# Patient Record
Sex: FEMALE | Race: WHITE | NOT HISPANIC OR LATINO | Employment: FULL TIME | URBAN - METROPOLITAN AREA
[De-identification: names, ages, dates, MRNs, and addresses within clinical notes are randomized per-mention and may not be internally consistent; named-entity substitution may affect disease eponyms.]

---

## 2022-01-26 ENCOUNTER — LAB VISIT (OUTPATIENT)
Dept: LAB | Facility: HOSPITAL | Age: 27
End: 2022-01-26
Attending: FAMILY MEDICINE
Payer: COMMERCIAL

## 2022-01-26 ENCOUNTER — OFFICE VISIT (OUTPATIENT)
Dept: PRIMARY CARE CLINIC | Facility: CLINIC | Age: 27
End: 2022-01-26
Payer: COMMERCIAL

## 2022-01-26 VITALS
OXYGEN SATURATION: 98 % | TEMPERATURE: 98 F | HEART RATE: 91 BPM | BODY MASS INDEX: 25.71 KG/M2 | HEIGHT: 67 IN | SYSTOLIC BLOOD PRESSURE: 120 MMHG | WEIGHT: 163.81 LBS | DIASTOLIC BLOOD PRESSURE: 70 MMHG

## 2022-01-26 DIAGNOSIS — Z00.01 ENCOUNTER FOR PREVENTATIVE ADULT HEALTH CARE EXAM WITH ABNORMAL FINDINGS: ICD-10-CM

## 2022-01-26 DIAGNOSIS — Z00.01 ENCOUNTER FOR PREVENTATIVE ADULT HEALTH CARE EXAM WITH ABNORMAL FINDINGS: Primary | ICD-10-CM

## 2022-01-26 DIAGNOSIS — M77.8 RIGHT ELBOW TENDONITIS: ICD-10-CM

## 2022-01-26 LAB
CHOLEST SERPL-MCNC: 144 MG/DL (ref 120–199)
CHOLEST/HDLC SERPL: 2.4 {RATIO} (ref 2–5)
ESTIMATED AVG GLUCOSE: 97 MG/DL (ref 68–131)
HBA1C MFR BLD: 5 % (ref 4–5.6)
HDLC SERPL-MCNC: 59 MG/DL (ref 40–75)
HDLC SERPL: 41 % (ref 20–50)
LDLC SERPL CALC-MCNC: 67.6 MG/DL (ref 63–159)
NONHDLC SERPL-MCNC: 85 MG/DL
TRIGL SERPL-MCNC: 87 MG/DL (ref 30–150)

## 2022-01-26 PROCEDURE — 36415 COLL VENOUS BLD VENIPUNCTURE: CPT | Mod: PN | Performed by: FAMILY MEDICINE

## 2022-01-26 PROCEDURE — 80061 LIPID PANEL: CPT | Performed by: FAMILY MEDICINE

## 2022-01-26 PROCEDURE — 3078F PR MOST RECENT DIASTOLIC BLOOD PRESSURE < 80 MM HG: ICD-10-PCS | Mod: CPTII,S$GLB,, | Performed by: FAMILY MEDICINE

## 2022-01-26 PROCEDURE — 87389 HIV-1 AG W/HIV-1&-2 AB AG IA: CPT | Performed by: FAMILY MEDICINE

## 2022-01-26 PROCEDURE — 1159F PR MEDICATION LIST DOCUMENTED IN MEDICAL RECORD: ICD-10-PCS | Mod: CPTII,S$GLB,, | Performed by: FAMILY MEDICINE

## 2022-01-26 PROCEDURE — 1160F PR REVIEW ALL MEDS BY PRESCRIBER/CLIN PHARMACIST DOCUMENTED: ICD-10-PCS | Mod: CPTII,S$GLB,, | Performed by: FAMILY MEDICINE

## 2022-01-26 PROCEDURE — 83036 HEMOGLOBIN GLYCOSYLATED A1C: CPT | Performed by: FAMILY MEDICINE

## 2022-01-26 PROCEDURE — 3074F PR MOST RECENT SYSTOLIC BLOOD PRESSURE < 130 MM HG: ICD-10-PCS | Mod: CPTII,S$GLB,, | Performed by: FAMILY MEDICINE

## 2022-01-26 PROCEDURE — 99999 PR PBB SHADOW E&M-EST. PATIENT-LVL IV: CPT | Mod: PBBFAC,,, | Performed by: FAMILY MEDICINE

## 2022-01-26 PROCEDURE — 3008F PR BODY MASS INDEX (BMI) DOCUMENTED: ICD-10-PCS | Mod: CPTII,S$GLB,, | Performed by: FAMILY MEDICINE

## 2022-01-26 PROCEDURE — 99999 PR PBB SHADOW E&M-EST. PATIENT-LVL IV: ICD-10-PCS | Mod: PBBFAC,,, | Performed by: FAMILY MEDICINE

## 2022-01-26 PROCEDURE — 99385 PR PREVENTIVE VISIT,NEW,18-39: ICD-10-PCS | Mod: S$GLB,,, | Performed by: FAMILY MEDICINE

## 2022-01-26 PROCEDURE — 1160F RVW MEDS BY RX/DR IN RCRD: CPT | Mod: CPTII,S$GLB,, | Performed by: FAMILY MEDICINE

## 2022-01-26 PROCEDURE — 3078F DIAST BP <80 MM HG: CPT | Mod: CPTII,S$GLB,, | Performed by: FAMILY MEDICINE

## 2022-01-26 PROCEDURE — 3008F BODY MASS INDEX DOCD: CPT | Mod: CPTII,S$GLB,, | Performed by: FAMILY MEDICINE

## 2022-01-26 PROCEDURE — 3074F SYST BP LT 130 MM HG: CPT | Mod: CPTII,S$GLB,, | Performed by: FAMILY MEDICINE

## 2022-01-26 PROCEDURE — 99385 PREV VISIT NEW AGE 18-39: CPT | Mod: S$GLB,,, | Performed by: FAMILY MEDICINE

## 2022-01-26 PROCEDURE — 1159F MED LIST DOCD IN RCRD: CPT | Mod: CPTII,S$GLB,, | Performed by: FAMILY MEDICINE

## 2022-01-26 PROCEDURE — 86803 HEPATITIS C AB TEST: CPT | Performed by: FAMILY MEDICINE

## 2022-01-26 NOTE — PATIENT INSTRUCTIONS
Patient Education       Tendinopathy   About this topic   A tendon is a thick cord that attaches muscle to bone. Tendinopathy is an injury to the tendon. There may be irritation or small tears to the tendon. There are different types of tendinopathy.  · Tendinosis ? Is a problem when the tendons break down and likely tear over time. The structure of the tendon, in fact, changes. There is no swelling or inflammation present.  · Tenosynovitis ? The thin cover around a tendon, called the tendon sheath, becomes swollen.  · Calcific tendinopathy ? Calcium deposits form in the tendon. This is most common in the shoulder or rotator cuff tendons.  This condition may happen in any tendon. It often happens in the hand, shoulder, elbow, or wrist. Some people have problems in their knee or the back of the heel.  What are the causes?   · Injury  · Using the tendon over and over  · Infection  · Some drugs  What can make this more likely to happen?   Things that lead to tendinopathy are:  · Age ? As people get older, their tendons become less flexible.  · Work ? More common with work that uses repeated motions, awkward positions, frequent reaching, and forceful effort.  · Sports ? Doing the same moves done over and over like those used in baseball, basketball, bowling, golf, and tennis.  · Other things ? Being heavy, certain illnesses like diabetes or rheumatoid arthritis.  What are the main signs?   You may feel:  · Pain and soreness along a tendon, most often near a joint. It is worse with added weight, movement, and often happens at night.  · Pain is severe or sharp in early tendinopathy and then is more of a dull ache in later stages of tendinopathy.  · A grinding feeling when you move the tendon  · Lack of strength due to pain  · Stiffness or problems moving the tendon. This is often worse in the morning.  · A lump on the tendon  · Range of motion may be limited  · Muscle wasting or weakness  The skin over the tendon may be  swollen, red, and warm as well.  How does the doctor diagnose this health problem?   Your doctor will do an exam and take your history. The doctor may push, pull, feel, and move the sore part. You may need to have tests like:  · X-ray to show if there are calcium deposits  · CT or MRI  · Ultrasound  · Lab tests  How does the doctor treat this health problem?   · Rest the painful part  · Place an ice pack or a bag of frozen peas wrapped in a towel over the painful part. Never put ice right on the skin. Do not leave the ice on more than 10 to 15 minutes at a time.  · Compress or wrap the swollen part to help ease pain. The doctor may give you a brace or strap to wear.  · Prop the sore part on pillows.  · Skip any action that makes the pain worse.  · Your doctor may suggest an exercise program to build muscle strength. You may need to see a physical therapist.  · Your doctor may use a sling or splint to keep the affected part from moving.  · You may be told to do a special kind of massage on the sore part. This is a cross friction massage.  · You may be told to do ice therapy over the tendon. This is done by freezing water in a styrofoam or paper cup. Then you rub the area over the sore tendon with the ice for 5 to 10 minutes up to 3 to 5 times a day.  What drugs may be needed?   The doctor may order drugs to:  · Help with pain and swelling  · Prevent an infection if you had surgery  The doctor may give you a shot of an anti-inflammatory drug called a corticosteroid. This will help with swelling. Talk with your doctor about the risks of this shot.  What problems could happen?   · Tendon ruptures or fully breaks apart  · Ongoing pain  · Problem comes back  · Problems doing activities  · More weakness or stiffness  What can be done to prevent this health problem?   · Take breaks often when doing things that use the same movement over and over.  · Stay active and work out to keep your muscles strong and flexible.  · Warm  up slowly and stretch before you work out. Use good ways to train, such as slowly adding to how far you run. Do not work out if you are overly tired. Take extra care if working out in cold weather.  · Keep a healthy weight. Being heavy puts more stress on your joints. This makes them more likely to be hurt.  · Wear braces or straps during activities if you get the same problem over and over.  Where can I learn more?   [employer]  https://www.betterhealth.aneesh.gov.au/health/ConditionsAndTreatments/tendonitis   [facility]  http://www.nhs.uk/Conditions/Tendonitis/Pages/Symptoms.aspx   Last Reviewed Date   2020-10-21  Consumer Information Use and Disclaimer   This information is not specific medical advice and does not replace information you receive from your health care provider. This is only a brief summary of general information. It does NOT include all information about conditions, illnesses, injuries, tests, procedures, treatments, therapies, discharge instructions or life-style choices that may apply to you. You must talk with your health care provider for complete information about your health and treatment options. This information should not be used to decide whether or not to accept your health care providers advice, instructions or recommendations. Only your health care provider has the knowledge and training to provide advice that is right for you.  Copyright   Copyright © 2021 [employer] and its affiliates and/or licensors. All rights reserved.  Patient Education       Lateral Epicondylitis Exercises   About this topic   The elbow is where your upper arm bone meets the two lower bones in your arm. There is a bump on the outside of your elbow at the bottom of your upper arm bone. It is the lateral epicondyle. A few tendons attach here. Tendons attach muscles to bone. These muscles are used to pull your wrist and fingers up.   When these tendons get sore and swollen from overuse,  you have lateral epicondylitis. Is also called tennis elbow. This is a common problem in tennis players. It may also happen with other activities or sports. You are more likely to have this problem in the arm you use most, but it can happen in either arm. Exercises can help to make this problem better.  General   Before starting with a program, ask your doctor if you are healthy enough to do these exercises. Your doctor may have you work with a  or physical therapist to make a safe exercise program to meet your needs.  Stretching Exercises   Stretching exercises keep your muscles flexible. They also stop them from getting tight. Start by doing each of these stretches 2 to 3 times. In order for your body to make changes, you will need to hold these stretches for 20 to 30 seconds. Try to do the stretches 2 to 3 times each day. Do all exercises slowly.  · Wrist stretches bending down ? Straighten your elbow and have your palm facing down. Keeping your sore elbow straight, bend your hand and fingers down so that your fingers are now pointing to the floor. Grab your hand with your other hand and push back the wrist further until you feel a stretch.  Strengthening Exercises   Strengthening exercises keep your muscles firm and strong. Start by repeating each exercise 2 to 3 times. Work up to doing each exercise 10 times. Try to do the exercises 2 to 3 times each day. Do all exercises slowly.  Some exercises can be done holding a small weight. You can use a can of soup or a hand weight. If the exercise gets too easy, use heavier weights or do the exercise more times.  · Wrist exercises seated with your lower arm supported on a table or your leg. Your hand should be off the edge:  ? Bending up ? Have your palm facing UP with a small weight in your hand. Bend your wrist up as far as you can. Now, lower the weight back down. Be sure to control the weight as you lower it. Repeat using other hand.  ? Bending down ? Have  your palm facing DOWN with a small weight in your hand. Bend your wrist back as far as you can. Now, lower the weight back down. Be sure to control the weight as you lower it. Repeat using other hand.  ? Side-to-side ? Position your hand SIDEWAYS with your thumb up. With a weight in your hand, lift your hand straight up. Now, lower your hand back down. Repeat using other hand.  · Lower arm twists with weight ? Start by having your elbow bent with your arm at your side. Hold a small weight in your hand. Keeping your arm at your side and not moving your elbow, turn the lower arm until your palm is facing down. Now, turn the lower arm until your palm is facing up. Repeat using other hand.  · Finger spreads with rubber band ? Find a thick rubber band. Straighten your fingers and bring them together so they are touching each other. Place the rubber band around your fingers and thumb as close to the nails as possible. Spread your fingers out as far as you can. Then, slowly bring them back to the starting position.  · Ball squeezes ? Gently squeeze a tennis ball 10 times. If you have no pain, squeeze with more pressure.  · Tennis strokes with exercise band ? Once your pain has lessened and you are almost ready to return to the tennis court, try these 3 exercises. You will need to exercise near a door or closet that can be opened and closed easily. Start with a thinner band and work your way up to a thicker band. These band exercises can help you with three tennis strokes:  ? Damon ? Secure an exercise band in a door at waist level. Stand sideways with the hand you use the closest to the door. Grab the band with that hand. Pull the band across your body like you do in a damon motion.  ? Backhand ? Secure an exercise band in a door at waist level. Stand sideways with the hand you use the most away from the door. Reach toward the door and grab the band with this hand. Now, pull the band across your body like you do in a  backhand motion.  ? Serve ? Secure an exercise band in a door at shoulder level. Stand facing away from the door. Grab the band with the hand you use the most. Start with your hand up and behind your head like you would for the start of a serve. Pull the band up and over like you are doing a serving motion.  Your doctor or therapist may also have you use a rubber bar or Flex bar for exercises to help your lateral epicondylitis.               What will the results be?   · Less pain and swelling  · Increased strength  · Better range of motion  · Greater ease doing arm activities  Helpful tips   · Stay active and work out to keep your muscles strong and flexible.  · Keep a healthy weight to avoid putting too much stress on your joints. Eat a healthy diet to keep your muscles healthy.  · Be sure you do not hold your breath when exercising. This can raise your blood pressure. If you tend to hold your breath, try counting out loud when exercising. If any exercise bothers you, stop right away.  · Always warm up before stretching. Heated muscles stretch much easier than cool muscles. Stretching cool muscles can lead to injury.  · Try walking and swinging your arms at an easy pace for a few minutes to warm up your muscles. Do this again after exercising.  · Never bounce when doing stretches.  · Doing exercises before a meal may be a good way to get into a routine.  · If you are using weights, choose a weight that will allow you to repeat the exercise 10 times before resting. If you easily do 10 repeats, you may not be using enough weight. If you are not able to do 10 repeats, you are using too heavy of a weight.  · Exercise may be slightly uncomfortable, but you should not have sharp pains. If you do get sharp pains, stop what you are doing. If the sharp pains continue, call your doctor.  Where can I learn more?   American Academy [employer]  [employer]  https://orthoinfo.aaos.org/en/recovery/epicondylitis-therapeutic-exercise-program/   Last Reviewed Date   2021-08-03  Consumer Information Use and Disclaimer   This information is not specific medical advice and does not replace information you receive from your health care provider. This is only a brief summary of general information. It does NOT include all information about conditions, illnesses, injuries, tests, procedures, treatments, therapies, discharge instructions or life-style choices that may apply to you. You must talk with your health care provider for complete information about your health and treatment options. This information should not be used to decide whether or not to accept your health care providers advice, instructions or recommendations. Only your health care provider has the knowledge and training to provide advice that is right for you.  Copyright   Copyright © 2021 [employer] and its affiliates and/or licensors. All rights reserved.  Patient Education       Overuse Injuries   About this topic   Overuse injuries can happen to your bones, nerves, muscles, joints, tendons, or ligaments. If you overdo an exercise or activity you may notice swelling, stiffness, or soreness. These are the first signs of overuse.  What are the causes?   Overuse injuries may be caused by:  · Doing the same activity over and over  · Not giving your body a chance to rest or recover after an activity  · Doing too much exercise.  · Poor training  What can make this more likely to happen?   · Not being flexible around your joint  · Some kinds of body alignment, like flat feet or bowlegs  · Tight or weak muscles  · Old injuries  · Kind of equipment you use to train  What are the main signs?   The first sign of an overuse injury may be pain. This may go away when you warm up. If your problem continues, your pain may go away at first, but then come back after you are finished with your activity. Later, you  may start to have more pain with your activity. Finally, you may have pain all the time or problems using your body part. Other signs might include swelling, redness, stiffness, muscle cramping, or spasm.  How does the doctor diagnose this health problem?   The doctor will ask you questions about your health history and do an exam. The doctor may have you move your sore body part. The doctor may order:  · X-rays  · MRI or CT scan  · Lab tests  How does the doctor treat this health problem?   Your doctor will first want you to stop doing what is causing the pain or stress to your body. You may not be able to stop it completely but try to limit the amount of time you do it. You can also change how you do the activity that is bothering you. Take short breaks more often. Your doctor may suggest:  · Rest  · Ice  · Splints or elastic bandages for support  · Physical therapy  · Chiropractic care  · Massage  · Surgery  What drugs may be needed?   The doctor may order drugs to:  · Help with pain and swelling  · Help relax muscle spasm  The doctor may give you a shot of an anti-inflammatory drug called a corticosteroid. This will help with swelling.  Helpful tips   · Take frequent rest breaks when doing the same motion or activity. Vary tasks and exercises if possible.  · Stay active and work out to keep your muscles strong and flexible.  · Warm up slowly and stretch after you work out. Use good ways to train, such as slowly adding to how far you run. Do not work out if you are overly tired. Take extra care if working out in very cold or very hot weather.  · Vary the activities you do and how you train.  · Wear the right equipment when playing sports.  · Always maintain good posture.  · Keep a healthy weight. Being heavy puts more stress on your joints and then you are more likely to get hurt.  · Wear supportive shoes. If your feet are flat, talk with your doctor about getting inserts or orthotics for your shoes.  · Request a  "workplace assessment so that adjustments can be made to make your tasks more comfortable.  Where can I learn more?   American Academy of [employer]icians  https://familydoctor.org/common-sports-injuries/   [facility] Choices  https://www.nhs.uk/conditions/repetitive-strain-injury-rsi/   Last Reviewed Date   2020-10-09  Consumer Information Use and Disclaimer   This information is not specific medical advice and does not replace information you receive from your health care provider. This is only a brief summary of general information. It does NOT include all information about conditions, illnesses, injuries, tests, procedures, treatments, therapies, discharge instructions or life-style choices that may apply to you. You must talk with your health care provider for complete information about your health and treatment options. This information should not be used to decide whether or not to accept your health care providers advice, instructions or recommendations. Only your health care provider has the knowledge and training to provide advice that is right for you.  Copyright   Copyright © 2021 Up[employer], Inc. and its affiliates and/or licensors. All rights reserved.  Patient Education       Elbow Tendinopathy (Tennis and Golf Elbow)   The Basics   Written by the doctors and editors at [employer]   What is elbow tendinopathy? -- Elbow tendinopathy is a condition that causes elbow pain and forearm weakness. The word "tendinopathy" refers to a problem with a tendon. Tendons are strong bands of tissue that connect muscles to bones. Depending on which elbow tendon is injured, the condition is also known as "tennis elbow" or "golf elbow." Doctors also used to call this condition "tendinitis."   What causes elbow tendinopathy? -- This condition can happen as people get older, especially if they do a lot of work or activity using their elbow and forearm. It can also happen when people get hurt or do the same movements over and " "over.  The terms "tennis elbow" and "golf elbow" refer to the swinging motion people do in these sports, which can cause tendinopathy. But other activities or jobs can also cause this problem if they involve similar movements.  What are the symptoms of elbow tendinopathy? -- The most common symptoms are:  · Elbow pain - This is the main symptom of elbow tendinopathy. Pain can start slowly or suddenly, and can be mild or more severe. It can spread to the upper arm or forearm. Pain is most common when the tendon is working or stretched.   · Muscle weakness - The forearm muscles might feel weak when you  or squeeze something.  · Swelling - Some people might have mild swelling in the elbow area.  Will I need tests? -- You might. Your doctor or nurse should be able to tell if you have elbow tendinopathy by talking with you and doing an exam. They might also have you do specific arm movements to better understand what motions or activities cause pain.   In some cases, the doctor might also do an imaging test, such as an ultrasound. Imaging tests create pictures of the inside of the body.  How is elbow tendinopathy treated? -- Most of the time, this condition will get better on its own, but it can take months to heal completely. To help get better, you can:  · Rest your elbow and arm - If possible, try to avoid or reduce activities that make your pain worse.  · Wear a brace or sleeve - Ask your doctor or nurse about this. Wearing a special brace or "compression sleeve" can help support your elbow and relieve pain. These work by reducing strain on the tendon when you use your arm.  · Take a pain-relieving medicine - Your doctor might recommend that you take a medicine such as acetaminophen (sample brand name: Tylenol), ibuprofen (sample brand names: Advil, Motrin), or naproxen (sample brand names: Aleve, Naprosyn).  · Put ice on your elbow - This might help after doing activities that make your pain worse. Put a cold " gel pack, bag of ice, or bag of frozen vegetables on the area every 1 to 2 hours, for 15 minutes each time. Put a thin towel between the ice (or other cold object) and your skin.   · Do stretches - Stretching the muscles in your lower arm can be helpful. The following are stretches you can do at home, depending on which tendon is injured:  ? Tennis elbow stretch - Hold your injured arm straight out, and point your fingers down to the ground. Use your other hand (with the thumb pressing on the palm) to grab this hand. Then press down on the back of the hand to bend the wrist more (picture 1). Hold this position for 30 seconds. Repeat the stretch 3 times. Do this exercise 1 time a day.  ? Golf elbow stretch - Stand an arm's length away from the wall, with the injured arm closest to the wall. Put your palm on the wall, with your fingers pointing down. Press gently against the wall to stretch your muscles (picture 2). Hold this position for 30 seconds. Repeat the stretch 3 times. Do this exercise 1 time a day.  · Get physical therapy - This involves learning specific exercises to help with your symptoms. The right exercises for you will depend on which elbow tendon is injured. Other types of exercises can help make the forearm muscles stronger. Your doctor, nurse, or physical therapist (exercise expert) can show you how to do these types of exercises. They will tell you when to start them and how often to do them.  What if my symptoms don't get better? -- If you have been doing your exercises for at least 8 to 12 weeks and your symptoms are not getting better, talk with your doctor or nurse. They can suggest other treatments that might help. They might also want to do imaging tests, like an ultrasound or X-ray, to see if something else might be causing your symptoms.  Rarely, elbow tendinopathy is treated with surgery. This might be considered if other treatments do not help after many months. But most of the time, the  condition will get better without surgery.  Can elbow tendinopathy be prevented? -- Yes. To help prevent elbow tendinopathy, you can:  · Take breaks when you do activities in which you move your elbow and wrist a lot.  · Keep your elbows slightly bent when you exercise or lift things.  · Wear gloves or use 2 hands when using tools.  · If you play tennis, use a 2-handed backhand swing.  · If you play golf, use  tape or padding on your golf clubs.  All topics are updated as new evidence becomes available and our peer review process is complete.  This topic retrieved from [employer] on: Sep 21, 2021.  Topic 79500 Version 7.0  Release: 29.4.2 - C29.263  © 2021 UpToDate, Inc. and/or its affiliates. All rights reserved.  picture 1: Forearm extensor stretch     Hold your injured arm straight out in front of you, palm down, with your fingers pointing toward the ground. Use your other hand to grab the hand on the injured side, pressing your thumb into the palm. Then press down on the back of the hand on the injured side so that your wrist bends further than it does on its own. Hold this position for 30 seconds.  Graphic 74399 Version 6.0    picture 2: Golf elbow stretch     Stand an arm's length away from the wall, with the injured arm closest to the wall. Put your palm on the wall, with your fingers pointing down. Press gently against the wall to stretch your muscles. Hold this position for 30 seconds. Repeat the stretch 3 times. Do this exercise 1 time a day.  Graphic 63628 Version 1.0    Consumer Information Use and Disclaimer   This information is not specific medical advice and does not replace information you receive from your health care provider. This is only a brief summary of general information. It does NOT include all information about conditions, illnesses, injuries, tests, procedures, treatments, therapies, discharge instructions or life-style choices that may apply to you. You must talk with your health care  provider for complete information about your health and treatment options. This information should not be used to decide whether or not to accept your health care provider's advice, instructions or recommendations. Only your health care provider has the knowledge and training to provide advice that is right for you. The use of this information is governed by the [employer] End User License Agreement, available at https://www.[employer]/en/solutions/[employer]/about/rere.The use of [employer] content is governed by the [employer] Terms of Use. ©2021 [employer] All rights reserved.  Copyright   © 2021 [employer] and/or its affiliates. All [id number]

## 2022-01-26 NOTE — PROGRESS NOTES
Subjective:      Patient ID: Karen Castillo is a 26 y.o. female.    Chief Complaint: Establish Care and Other    Disclaimer:  This note is prepared using voice recognition software and as such is likely to have errors and has not been proof read. Please contact me for questions.     Karen Castillo is a 26 y.o. female who presents today to establish care.   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 Will use braces and ergonomic settup.   Was using the [place] [employer]. But she left. It did help.   With soreness and will get painful with movement. Will use ibuprofen, ice, no numbness. Gets    Left ear pain - felt like it was blocked up and did h202 and some stuff came out. Better than it was.     History reviewed. No pertinent past medical history.  Past Surgical History:  10/07/2021: SALPINGECTOMY; Bilateral  10/07/2021: TUBAL LIGATION      Comment:  Norma  family history includes Asthma in her sister; Diabetes in her father; Hypothyroidism in her maternal grandmother; Lung cancer in her maternal grandfather; Migraines in her mother.  Social History    Socioeconomic History      Marital status: Single    Occupational History        Comment:      Tobacco Use      Smoking status: Never Smoker      Smokeless tobacco: Never Used    Substance and Sexual Activity      Alcohol use: Yes        Alcohol/week: 2.0 standard drinks        Types: 2 Glasses of wine per week      Drug use: Not Currently        Types: Marijuana        Comment: Used to manage anxiety in college      Sexual activity: Yes        Partners: Male        Birth control/protection: See Surgical Hx        Comment: Norma Felipe is GYN. Not on micronor at this time. Has BTL.   Went to [employer]. Studied BIS.  now.           No results found for: WBC, HGB, HCT, PLT, CHOL, TRIG, HDL, LDLDIRECT, ALT, AST, NA,  "K, CL, CREATININE, BUN, CO2, TSH, PSA, INR, GLUF, HGBA1C, MICROALBUR    No image results found.        Review of Systems   Constitutional: Negative for activity change, appetite change, chills, fatigue and fever.   HENT: Negative for ear pain and trouble swallowing.    Eyes: Negative for pain and visual disturbance.   Respiratory: Negative for cough and shortness of breath.    Cardiovascular: Negative for chest pain and leg swelling.   Gastrointestinal: Negative for abdominal pain, blood in stool, nausea and vomiting.   Endocrine: Negative for cold intolerance and heat intolerance.   Genitourinary: Negative for dysuria and frequency.   Musculoskeletal: Positive for myalgias. Negative for arthralgias, joint swelling and neck pain.   Skin: Negative for color change and rash.   Neurological: Negative for dizziness, weakness, numbness and headaches.   Psychiatric/Behavioral: Negative for behavioral problems and sleep disturbance.     Objective:     Vitals:    01/26/22 0816   BP: 120/70   Pulse: 91   Temp: 97.6 °F (36.4 °C)   SpO2: 98%   Weight: 74.3 kg (163 lb 12.8 oz)   Height: 5' 7" (1.702 m)     Physical Exam  Vitals reviewed.   Constitutional:       Appearance: Normal appearance. She is well-developed and normal weight.   HENT:      Head: Normocephalic and atraumatic.      Right Ear: Tympanic membrane and external ear normal.      Left Ear: Tympanic membrane and external ear normal.      Nose: Nose normal.      Mouth/Throat:      Mouth: Mucous membranes are moist.      Pharynx: Oropharynx is clear.   Eyes:      Conjunctiva/sclera: Conjunctivae normal.      Pupils: Pupils are equal, round, and reactive to light.   Neck:      Thyroid: No thyromegaly.   Cardiovascular:      Rate and Rhythm: Normal rate and regular rhythm.      Heart sounds: No murmur heard.  No friction rub. No gallop.    Pulmonary:      Effort: Pulmonary effort is normal. No respiratory distress.      Breath sounds: No wheezing or rales.   Abdominal: "      General: Bowel sounds are normal. There is no distension.      Palpations: Abdomen is soft.      Tenderness: There is no abdominal tenderness. There is no rebound.   Musculoskeletal:      Right elbow: No swelling. Decreased range of motion. Tenderness present in lateral epicondyle.      Left elbow: Normal range of motion. No tenderness.      Right forearm: Tenderness and bony tenderness present. No swelling or edema.      Left forearm: No swelling, edema, tenderness or bony tenderness.      Right wrist: Normal.      Left wrist: Normal.      Cervical back: Normal range of motion and neck supple.   Lymphadenopathy:      Cervical: No cervical adenopathy.   Skin:     General: Skin is warm and dry.      Findings: No rash.   Neurological:      Mental Status: She is alert and oriented to person, place, and time.   Psychiatric:         Attention and Perception: Attention and perception normal.         Mood and Affect: Mood and affect normal.         Speech: Speech normal.         Behavior: Behavior normal.         Thought Content: Thought content normal.         Cognition and Memory: Cognition and memory normal.         Judgment: Judgment normal.       Assessment:     1. Encounter for preventative adult health care exam with abnormal findings    2. Right elbow tendonitis      Plan:   Karen was seen today for [facility] care and other.    Diagnoses and all orders for this visit:    Encounter for preventative adult health care exam with abnormal findings  Comments:  new patient. discussed HM issues. sees GYN. interested in flu shot but on a friday. labs today.   Orders:  -     Lipid Panel; Future  -     Hemoglobin A1C; Future  -     HIV 1/2 Ag/Ab (4th Gen); Future  -     Hepatitis C Antibody; Future    Right elbow tendonitis  Comments:  new problem. discussed home exercises, PT referral placed, bracing, topical treatments of voltaren gel and or biofreeze. if not improving, will refer to ortho.   Orders:  -     Ambulatory  referral/consult to Physical/Occupational Therapy; Future            Follow up in about 1 year (around 1/26/2023) for physical with Dr HENDRICKSON.    Patient Instructions   Patient Education       Tendinopathy   About this topic   A tendon is a thick cord that attaches muscle to bone. Tendinopathy is an injury to the tendon. There may be irritation or small tears to the tendon. There are different types of tendinopathy.  · Tendinosis ? Is a problem when the tendons break down and likely tear over time. The structure of the tendon, in fact, changes. There is no swelling or inflammation present.  · Tenosynovitis ? The thin cover around a tendon, called the tendon sheath, becomes swollen.  · Calcific tendinopathy ? Calcium deposits form in the tendon. This is most common in the shoulder or rotator cuff tendons.  This condition may happen in any tendon. It often happens in the hand, shoulder, elbow, or wrist. Some people have problems in their knee or the back of the heel.  What are the causes?   · Injury  · Using the tendon over and over  · Infection  · Some drugs  What can make this more likely to happen?   Things that lead to tendinopathy are:  · Age ? As people get older, their tendons become less flexible.  · Work ? More common with work that uses repeated motions, awkward positions, frequent reaching, and forceful effort.  · Sports ? Doing the same moves done over and over like those used in baseball, basketball, bowling, golf, and tennis.  · Other things ? Being heavy, certain illnesses like diabetes or rheumatoid arthritis.  What are the main signs?   You may feel:  · Pain and soreness along a tendon, most often near a joint. It is worse with added weight, movement, and often happens at night.  · Pain is severe or sharp in early tendinopathy and then is more of a dull ache in later stages of tendinopathy.  · A grinding feeling when you move the tendon  · Lack of strength due to pain  · Stiffness or problems moving the  tendon. This is often worse in the morning.  · A lump on the tendon  · Range of motion may be limited  · Muscle wasting or weakness  The skin over the tendon may be swollen, red, and warm as well.  How does the doctor diagnose this health problem?   Your doctor will do an exam and take your history. The doctor may push, pull, feel, and move the sore part. You may need to have tests like:  · X-ray to show if there are calcium deposits  · CT or MRI  · Ultrasound  · Lab tests  How does the doctor treat this health problem?   · Rest the painful part  · Place an ice pack or a bag of frozen peas wrapped in a towel over the painful part. Never put ice right on the skin. Do not leave the ice on more than 10 to 15 minutes at a time.  · Compress or wrap the swollen part to help ease pain. The doctor may give you a brace or strap to wear.  · Prop the sore part on pillows.  · Skip any action that makes the pain worse.  · Your doctor may suggest an exercise program to build muscle strength. You may need to see a physical therapist.  · Your doctor may use a sling or splint to keep the affected part from moving.  · You may be told to do a special kind of massage on the sore part. This is a cross friction massage.  · You may be told to do ice therapy over the tendon. This is done by freezing water in a styrofoam or paper cup. Then you rub the area over the sore tendon with the ice for 5 to 10 minutes up to 3 to 5 times a day.  What drugs may be needed?   The doctor may order drugs to:  · Help with pain and swelling  · Prevent an infection if you had surgery  The doctor may give you a shot of an anti-inflammatory drug called a corticosteroid. This will help with swelling. Talk with your doctor about the risks of this shot.  What problems could happen?   · Tendon ruptures or fully breaks apart  · Ongoing pain  · Problem comes back  · Problems doing activities  · More weakness or stiffness  What can be done to prevent this health  problem?   · Take breaks often when doing things that use the same movement over and over.  · Stay active and work out to keep your muscles strong and flexible.  · Warm up slowly and stretch before you work out. Use good ways to train, such as slowly adding to how far you run. Do not work out if you are overly tired. Take extra care if working out in cold weather.  · Keep a healthy weight. Being heavy puts more stress on your joints. This makes them more likely to be hurt.  · Wear braces or straps during activities if you get the same problem over and over.  Where can I learn more?   [employer]  https://www.betterhealth.aneesh.gov.au/health/ConditionsAndTreatments/tendonitis   National Health Service  http://www.nhs.uk/Conditions/Tendonitis/Pages/Symptoms.aspx   Last Reviewed Date   2020-10-21  Consumer Information Use and Disclaimer   This information is not specific medical advice and does not replace information you receive from your health care provider. This is only a brief summary of general information. It does NOT include all information about conditions, illnesses, injuries, tests, procedures, treatments, therapies, discharge instructions or life-style choices that may apply to you. You must talk with your health care provider for complete information about your health and treatment options. This information should not be used to decide whether or not to accept your health care providers advice, instructions or recommendations. Only your health care provider has the knowledge and training to provide advice that is right for you.  Copyright   Copyright © 2021 UpToDate, Inc. and its affiliates and/or licensors. All rights reserved.  Patient Education       Lateral Epicondylitis Exercises   About this topic   The elbow is where your upper arm bone meets the two lower bones in your arm. There is a bump on the outside of your elbow at the bottom of your upper arm bone. It is the lateral epicondyle. A  few tendons attach here. Tendons attach muscles to bone. These muscles are used to pull your wrist and fingers up.   When these tendons get sore and swollen from overuse, you have lateral epicondylitis. Is also called tennis elbow. This is a common problem in tennis players. It may also happen with other activities or sports. You are more likely to have this problem in the arm you use most, but it can happen in either arm. Exercises can help to make this problem better.  General   Before starting with a program, ask your doctor if you are healthy enough to do these exercises. Your doctor may have you work with a  or physical therapist to make a safe exercise program to meet your needs.  Stretching Exercises   Stretching exercises keep your muscles flexible. They also stop them from getting tight. Start by doing each of these stretches 2 to 3 times. In order for your body to make changes, you will need to hold these stretches for 20 to 30 seconds. Try to do the stretches 2 to 3 times each day. Do all exercises slowly.  · Wrist stretches bending down ? Straighten your elbow and have your palm facing down. Keeping your sore elbow straight, bend your hand and fingers down so that your fingers are now pointing to the floor. Grab your hand with your other hand and push back the wrist further until you feel a stretch.  Strengthening Exercises   Strengthening exercises keep your muscles firm and strong. Start by repeating each exercise 2 to 3 times. Work up to doing each exercise 10 times. Try to do the exercises 2 to 3 times each day. Do all exercises slowly.  Some exercises can be done holding a small weight. You can use a can of soup or a hand weight. If the exercise gets too easy, use heavier weights or do the exercise more times.  · Wrist exercises seated with your lower arm supported on a table or your leg. Your hand should be off the edge:  ? Bending up ? Have your palm facing UP with a small weight in your  hand. Bend your wrist up as far as you can. Now, lower the weight back down. Be sure to control the weight as you lower it. Repeat using other hand.  ? Bending down ? Have your palm facing DOWN with a small weight in your hand. Bend your wrist back as far as you can. Now, lower the weight back down. Be sure to control the weight as you lower it. Repeat using other hand.  ? Side-to-side ? Position your hand SIDEWAYS with your thumb up. With a weight in your hand, lift your hand straight up. Now, lower your hand back down. Repeat using other hand.  · Lower arm twists with weight ? Start by having your elbow bent with your arm at your side. Hold a small weight in your hand. Keeping your arm at your side and not moving your elbow, turn the lower arm until your palm is facing down. Now, turn the lower arm until your palm is facing up. Repeat using other hand.  · Finger spreads with rubber band ? Find a thick rubber band. Straighten your fingers and bring them together so they are touching each other. Place the rubber band around your fingers and thumb as close to the nails as possible. Spread your fingers out as far as you can. Then, slowly bring them back to the starting position.  · Ball squeezes ? Gently squeeze a tennis ball 10 times. If you have no pain, squeeze with more pressure.  · Tennis strokes with exercise band ? Once your pain has lessened and you are almost ready to return to the tennis court, try these 3 exercises. You will need to exercise near a door or closet that can be opened and closed easily. Start with a thinner band and work your way up to a thicker band. These band exercises can help you with three tennis strokes:  ? Damon ? Secure an exercise band in a door at waist level. Stand sideways with the hand you use the closest to the door. Grab the band with that hand. Pull the band across your body like you do in a damon motion.  ? Backhand ? Secure an exercise band in a door at waist level.  Stand sideways with the hand you use the most away from the door. Reach toward the door and grab the band with this hand. Now, pull the band across your body like you do in a backhand motion.  ? Serve ? Secure an exercise band in a door at shoulder level. Stand facing away from the door. Grab the band with the hand you use the most. Start with your hand up and behind your head like you would for the start of a serve. Pull the band up and over like you are doing a serving motion.  Your doctor or therapist may also have you use a rubber bar or Flex bar for exercises to help your lateral epicondylitis.               What will the results be?   · Less pain and swelling  · Increased strength  · Better range of motion  · Greater ease doing arm activities  Helpful tips   · Stay active and work out to keep your muscles strong and flexible.  · Keep a healthy weight to avoid putting too much stress on your joints. Eat a healthy diet to keep your muscles healthy.  · Be sure you do not hold your breath when exercising. This can raise your blood pressure. If you tend to hold your breath, try counting out loud when exercising. If any exercise bothers you, stop right away.  · Always warm up before stretching. Heated muscles stretch much easier than cool muscles. Stretching cool muscles can lead to injury.  · Try walking and swinging your arms at an easy pace for a few minutes to warm up your muscles. Do this again after exercising.  · Never bounce when doing stretches.  · Doing exercises before a meal may be a good way to get into a routine.  · If you are using weights, choose a weight that will allow you to repeat the exercise 10 times before resting. If you easily do 10 repeats, you may not be using enough weight. If you are not able to do 10 repeats, you are using too heavy of a weight.  · Exercise may be slightly uncomfortable, but you should not have sharp pains. If you do get sharp pains, stop what you are doing. If the  sharp pains continue, call your doctor.  Where can I learn more?   American Academy [employer]  https://orthoinfo.aaos.org/en/recovery/epicondylitis-therapeutic-exercise-program/   Last Reviewed Date   2021-08-03  Consumer Information Use and Disclaimer   This information is not specific medical advice and does not replace information you receive from your health care provider. This is only a brief summary of general information. It does NOT include all information about conditions, illnesses, injuries, tests, procedures, treatments, therapies, discharge instructions or life-style choices that may apply to you. You must talk with your health care provider for complete information about your health and treatment options. This information should not be used to decide whether or not to accept your health care providers advice, instructions or recommendations. Only your health care provider has the knowledge and training to provide advice that is right for you.  Copyright   Copyright © 2021 UpToDate, Inc. and its affiliates and/or licensors. All rights reserved.  Patient Education       Overuse Injuries   About this topic   Overuse injuries can happen to your bones, nerves, muscles, joints, tendons, or ligaments. If you overdo an exercise or activity you may notice swelling, stiffness, or soreness. These are the first signs of overuse.  What are the causes?   Overuse injuries may be caused by:  · Doing the same activity over and over  · Not giving your body a chance to rest or recover after an activity  · Doing too much exercise.  · Poor training  What can make this more likely to happen?   · Not being flexible around your joint  · Some kinds of body alignment, like flat feet or bowlegs  · Tight or weak muscles  · Old injuries  · Kind of equipment you use to train  What are the main signs?   The first sign of an overuse injury may be pain. This may go away when you warm up. If your problem continues, your  pain may go away at first, but then come back after you are finished with your activity. Later, you may start to have more pain with your activity. Finally, you may have pain all the time or problems using your body part. Other signs might include swelling, redness, stiffness, muscle cramping, or spasm.  How does the doctor diagnose this health problem?   The doctor will ask you questions about your health history and do an exam. The doctor may have you move your sore body part. The doctor may order:  · X-rays  · MRI or CT scan  · Lab tests  How does the doctor treat this health problem?   Your doctor will first want you to stop doing what is causing the pain or stress to your body. You may not be able to stop it completely but try to limit the amount of time you do it. You can also change how you do the activity that is bothering you. Take short breaks more often. Your doctor may suggest:  · Rest  · Ice  · Splints or elastic bandages for support  · Physical therapy  · Chiropractic care  · Massage  · Surgery  What drugs may be needed?   The doctor may order drugs to:  · Help with pain and swelling  · Help relax muscle spasm  The doctor may give you a shot of an anti-inflammatory drug called a corticosteroid. This will help with swelling.  Helpful tips   · Take frequent rest breaks when doing the same motion or activity. Vary tasks and exercises if possible.  · Stay active and work out to keep your muscles strong and flexible.  · Warm up slowly and stretch after you work out. Use good ways to train, such as slowly adding to how far you run. Do not work out if you are overly tired. Take extra care if working out in very cold or very hot weather.  · Vary the activities you do and how you train.  · Wear the right equipment when playing sports.  · Always maintain good posture.  · Keep a healthy weight. Being heavy puts more stress on your joints and then you are more likely to get hurt.  · Wear supportive shoes. If your  "feet are flat, talk with your doctor about getting inserts or orthotics for your shoes.  · Request a workplace assessment so that adjustments can be made to make your tasks more comfortable.  Where can I learn more?   American Academy of [employer]icians  https://familydoctor.org/common-sports-injuries/   NHS Choices  https://www.nhs.uk/conditions/repetitive-strain-injury-rsi/   Last Reviewed Date   2020-10-09  Consumer Information Use and Disclaimer   This information is not specific medical advice and does not replace information you receive from your health care provider. This is only a brief summary of general information. It does NOT include all information about conditions, illnesses, injuries, tests, procedures, treatments, therapies, discharge instructions or life-style choices that may apply to you. You must talk with your health care provider for complete information about your health and treatment options. This information should not be used to decide whether or not to accept your health care providers advice, instructions or recommendations. Only your health care provider has the knowledge and training to provide advice that is right for you.  Copyright   Copyright © 2021 UpToDate, Inc. and its affiliates and/or licensors. All rights reserved.  Patient Education       Elbow Tendinopathy (Tennis and Golf Elbow)   The Basics   Written by the doctors and editors at [employer]   What is elbow tendinopathy? -- Elbow tendinopathy is a condition that causes elbow pain and forearm weakness. The word "tendinopathy" refers to a problem with a tendon. Tendons are strong bands of tissue that connect muscles to bones. Depending on which elbow tendon is injured, the condition is also known as "tennis elbow" or "golf elbow." Doctors also used to call this condition "tendinitis."   What causes elbow tendinopathy? -- This condition can happen as people get older, especially if they do a lot of work or activity using " "their elbow and forearm. It can also happen when people get hurt or do the same movements over and over.  The terms "tennis elbow" and "golf elbow" refer to the swinging motion people do in these sports, which can cause tendinopathy. But other activities or jobs can also cause this problem if they involve similar movements.  What are the symptoms of elbow tendinopathy? -- The most common symptoms are:  · Elbow pain - This is the main symptom of elbow tendinopathy. Pain can start slowly or suddenly, and can be mild or more severe. It can spread to the upper arm or forearm. Pain is most common when the tendon is working or stretched.   · Muscle weakness - The forearm muscles might feel weak when you  or squeeze something.  · Swelling - Some people might have mild swelling in the elbow area.  Will I need tests? -- You might. Your doctor or nurse should be able to tell if you have elbow tendinopathy by talking with you and doing an exam. They might also have you do specific arm movements to better understand what motions or activities cause pain.   In some cases, the doctor might also do an imaging test, such as an ultrasound. Imaging tests create pictures of the inside of the body.  How is elbow tendinopathy treated? -- Most of the time, this condition will get better on its own, but it can take months to heal completely. To help get better, you can:  · Rest your elbow and arm - If possible, try to avoid or reduce activities that make your pain worse.  · Wear a brace or sleeve - Ask your doctor or nurse about this. Wearing a special brace or "compression sleeve" can help support your elbow and relieve pain. These work by reducing strain on the tendon when you use your arm.  · Take a pain-relieving medicine - Your doctor might recommend that you take a medicine such as acetaminophen (sample brand name: Tylenol), ibuprofen (sample brand names: Advil, Motrin), or naproxen (sample brand names: Aleve, Naprosyn).  · Put " ice on your elbow - This might help after doing activities that make your pain worse. Put a cold gel pack, bag of ice, or bag of frozen vegetables on the area every 1 to 2 hours, for 15 minutes each time. Put a thin towel between the ice (or other cold object) and your skin.   · Do stretches - Stretching the muscles in your lower arm can be helpful. The following are stretches you can do at home, depending on which tendon is injured:  ? Tennis elbow stretch - Hold your injured arm straight out, and point your fingers down to the ground. Use your other hand (with the thumb pressing on the palm) to grab this hand. Then press down on the back of the hand to bend the wrist more (picture 1). Hold this position for 30 seconds. Repeat the stretch 3 times. Do this exercise 1 time a day.  ? Golf elbow stretch - Stand an arm's length away from the wall, with the injured arm closest to the wall. Put your palm on the wall, with your fingers pointing down. Press gently against the wall to stretch your muscles (picture 2). Hold this position for 30 seconds. Repeat the stretch 3 times. Do this exercise 1 time a day.  · Get physical therapy - This involves learning specific exercises to help with your symptoms. The right exercises for you will depend on which elbow tendon is injured. Other types of exercises can help make the forearm muscles stronger. Your doctor, nurse, or physical therapist (exercise expert) can show you how to do these types of exercises. They will tell you when to start them and how often to do them.  What if my symptoms don't get better? -- If you have been doing your exercises for at least 8 to 12 weeks and your symptoms are not getting better, talk with your doctor or nurse. They can suggest other treatments that might help. They might also want to do imaging tests, like an ultrasound or X-ray, to see if something else might be causing your symptoms.  Rarely, elbow tendinopathy is treated with surgery. This  might be considered if other treatments do not help after many months. But most of the time, the condition will get better without surgery.  Can elbow tendinopathy be prevented? -- Yes. To help prevent elbow tendinopathy, you can:  · Take breaks when you do activities in which you move your elbow and wrist a lot.  · Keep your elbows slightly bent when you exercise or lift things.  · Wear gloves or use 2 hands when using tools.  · If you play tennis, use a 2-handed backhand swing.  · If you play golf, use  tape or padding on your golf clubs.  All topics are updated as new evidence becomes available and our peer review process is complete.  This topic retrieved from [employer] on: Sep 21, 2021.  Topic 62586 Version 7.0  Release: 29.4.2 - C29.263  © 2021 UpToDate, Inc. and/or its affiliates. All rights reserved.  picture 1: Forearm extensor stretch     Hold your injured arm straight out in front of you, palm down, with your fingers pointing toward the ground. Use your other hand to grab the hand on the injured side, pressing your thumb into the palm. Then press down on the back of the hand on the injured side so that your wrist bends further than it does on its own. Hold this position for 30 seconds.  Graphic 60767 Version 6.0    picture 2: Golf elbow stretch     Stand an arm's length away from the wall, with the injured arm closest to the wall. Put your palm on the wall, with your fingers pointing down. Press gently against the wall to stretch your muscles. Hold this position for 30 seconds. Repeat the stretch 3 times. Do this exercise 1 time a day.  Graphic 08247 Version 1.0    Consumer Information Use and Disclaimer   This information is not specific medical advice and does not replace information you receive from your health care provider. This is only a brief summary of general information. It does NOT include all information about conditions, illnesses, injuries, tests, procedures, treatments, therapies,  discharge instructions or life-style choices that may apply to you. You must talk with your health care provider for complete information about your health and treatment options. This information should not be used to decide whether or not to accept your health care provider's advice, instructions or recommendations. Only your health care provider has the knowledge and training to provide advice that is right for you. The use of this information is governed by the [employer] End User License Agreement, available at https://www.[employer]/en/solutions/[employer]/about/rere.The use of [employer] content is governed by the [employer] Terms of Use. ©2021 [employer] All rights reserved.  Copyright   © 2021 UpToDate, Inc. and/or its affiliates. All rights reserved.

## 2022-01-28 LAB
HCV AB SERPL QL IA: NEGATIVE
HIV 1+2 AB+HIV1 P24 AG SERPL QL IA: NEGATIVE

## 2022-02-28 ENCOUNTER — CLINICAL SUPPORT (OUTPATIENT)
Dept: REHABILITATION | Facility: HOSPITAL | Age: 27
End: 2022-02-28
Payer: COMMERCIAL

## 2022-02-28 DIAGNOSIS — M77.8 RIGHT ELBOW TENDONITIS: ICD-10-CM

## 2022-02-28 DIAGNOSIS — R52 PAIN: ICD-10-CM

## 2022-02-28 PROCEDURE — 97110 THERAPEUTIC EXERCISES: CPT | Mod: PN

## 2022-02-28 PROCEDURE — 97165 OT EVAL LOW COMPLEX 30 MIN: CPT | Mod: PN

## 2022-02-28 NOTE — PROGRESS NOTES
"  [facility] Occupational Therapy  Initial Evaluation     Date: 2/28/2022  Name: Karen Castillo  Clinic Number: [id number]    Therapy Diagnosis: No diagnosis found.  Physician: Adali Paz MD    Physician Orders: Evaluate and treat   Medical Diagnosis: M77.8 (ICD-10-CM) - Right elbow tendonitis   Surgical Procedure and Date: N/A , N/A   Evaluation Date: 2/28/2022  Insurance Authorization Period Expiration: 01/26/2022 - 01/26/2023   Plan of Care Certification Period: 2/28/2022 - 5/23/2022  Date of Return to MD: N/A     Visit # / Visits authorized: 1 / 1  Time In:10:00  Time Out: 10:45  Total Appointment Time (timed & untimed codes): 45 minutes    Precautions:  Standard    Subjective     Involved Side: right   Dominant Side: Right  Date of Onset: at least 4 years duration, continually gotten worse over last 2 years since the start of the pandemic   History of Current Condition/Mechanism of Injury: gradual onset of elbow pain over past 4 years. Has worked previously at [employer] and experienced elbow pain then.  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 She is currently unable to rest overuse injury, working approximately 60 hours per week and is attending graduate school as well.   Imaging: none   Previous Therapy: none     Past Medical History/Physical Systems Review:   Karen Castillo  has no past medical history on file.    Karen Castillo  has a past surgical history that includes Salpingectomy (Bilateral, 10/07/2021) and Tubal ligation (10/07/2021).    Karen currently has no medications in their medication list.    Review of patient's allergies indicates:   Allergen Reactions    Penicillins         Patient's Goals for Therapy: "To strengthen so I don't further injure or create lasting harm."     Pain:  Functional Pain Scale Rating 0-10:   3/10 on average  0/10 at best  5/10 at worst  Location: lateral elbow, when infalmmed spreads " to wrist; when you start I can feel it up into the shoulder   Description: Aching and tender and inflammed   Aggravating Factors: use of mouse and keyboard   Easing Factors: massage, pain medication, ice and epsom salt baths    Occupation:     Working presently: employed full-time; 60 hours per 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     Current FunctionalStatus   Home/Living environment : lives with boyfriend      Limitation of Functional Status as follows:   ADLs/IADLs:     - Feeding: independent     - Bathing: independent     - Dressing/Grooming: independent     - Driving: when having increased pain, uses left hand     Leisure: computer programming         Objective       Active Range of Motion: Measured in degrees    Wrist Left Right   Flexion 80 80   Extension 75 75   Radial Deviation 31 15   Ulnar Deviation 36 30   Supination WNL  WNL    Pronation WNL  WNL             Elbow      Flexion  145 145   Extension 0 0         Upper Extremity Strength  Wrist  Left Right   Wrist flexion 5/5 4+/5   Wrist extension  5/5 4+/5   Radial deviation 5/5 5/5   Ulnar deviation 5/5 5/5   Pronation  5/5 5/5   Supination  5/5 5/5   Elbow      Elbow flexion  5/5 5/5   Elbow extension  5/5 5/5         Special Tests:   Elbow Left Right    Resisted Middle Finger Negative Minimally Positive             Strength: (LONNY Dynamometer in lbs.) Average 3 trials, Position II:     2/28/2022 2/28/2022    Left Right   Elbow Flexed 37# 38#   Elbow Extended 40# 40#       Palpation Elbow:  TTP over common extensor tendon       Limitation/Restriction for FOTO forearm Survey    Therapist reviewed FOTO scores for Karen Castillo on 2/28/2022.   FOTO documents entered into [employer] - see Media section.    Limitation Score: 35%         Treatment     Treatment Time In: 10:50  Treatment Time Out: 11:00    Total Treatment time (time-based codes) separate from Evaluation: 10 minutes    Karen received therapeutic exercises for 10 minutes including:  -review of HEP   -office ergonomics packet issued     Home Exercise Program/Education:  Issued HEP (see patient instructions in EMR) and educated on modality use for pain management . Exercises were reviewed and Karen was able to demonstrate them prior to the end of the session.   Pt received a written copy of exercises to perform at home. Karen demonstrated good  understanding of the education provided.  Pt was advised to perform these exercises free of pain, and to stop performing them if pain occurs.    Patient/Family Education: role of OT, goals for OT, scheduling/cancellations - pt verbalized understanding. Discussed insurance limitations with patient.    Additional Education provided: frequent stretching throughout workday, at least hourly     Assessment     Karen Castillo is a 26 y.o. female referred to outpatient occupational therapy and presents with a medical diagnosis of M77.8 (ICD-10-CM) - Right elbow tendonitis   , resulting in Decreased  strength, Increased pain and Joint Stiffness and demonstrates limitations as described in the chart below. Following medical record review it is determined that pt will benefit from occupational therapy services in order to maximize pain free and/or functional use of right hand/wrist . The following goals were discussed with the patient and patient is in agreement with them as to be addressed in the treatment plan. The patient's rehab potential is Good.     Anticipated barriers to occupational therapy: compliance with HEP, therapy attendance   Pt has no cultural, educational or language barriers to learning provided.    Profile and History Assessment of Occupational Performance Level of Clinical Decision Making Complexity Score   Occupational Profile:   Karen Castillo is a 26 y.o. female who lives with an  adult  and is currently employed Karen Castillo has difficulty with  ADLs and IADLs as listed previously, which  Affecting herdaily functional abilities.      Comorbidities:    has no past medical history on file.    Medical and Therapy History Review:   Brief               Performance Deficits    Physical:  Muscle Power/Strength  Muscle Endurance   Strength  Pain    Cognitive:  No Deficits    Psychosocial:    No Deficits     Clinical Decision Making:  low    Assessment Process:  Problem-Focused Assessments    Modification/Need for Assistance:  Not Necessary    Intervention Selection:  Several Treatment Options       low  Based on PMHX, co morbidities , data from assessments and functional level of assistance required with task and clinical presentation directly impacting function.       The following goals were discussed with the patient and patient is in agreement with them as to be addressed in the treatment plan.       Goals:   Short Term Goals: (in 6 weeks)  1) Patient will be independent in HEP  2) Decrease pain in right elbow to no more than 2-3/10 worst in ADL/IADL's  3) Patient will increase strength in right wrist extension/ flexion to 5/5 muscle grade to increase functional use for ADLs/IADLs  4) Increase right  strength by 3-5 psi for increased functional use  5) Patient will tolerate light resistive gripping 3 min with 1 rest breaks to increase functional use for ADLs/IADLs        Long Term Goals: (in 12 weeks)  1) Decrease pain in right elbow to no more than 0-1/10 worst in ADL/IADL's  2) Patient will perform repetitive overhead elbow extension task for 3 minutes without rest break to increase functional use for ADLs/IADLs  3) Increase muscular endurance of right wrist musculature by performing repetitive forearm motion/task for 3 minutes with no rest break   4) Patient will tolerate minimally resistive gripping exercise 5 min without rest breaks to increase functional use for  ADLs/IADLs   5) Increased functioning in ADL/IADL's, as evidenced by a FOTO impairment rating of no more than 30%       Plan   Certification Period/Plan of care expiration: 2/28/2022 to 5/23/2022.    Outpatient Occupational Therapy 1 times weekly for 8 weeks to include the following interventions: Fluidotherapy, Manual therapy/joint mobilizations, Modalities for pain management, US 3 mhz, Therapeutic exercises/activities., Strengthening, Orthotic Fabrication/Fit/Training and work place ergonomics.      Ra Hsu, OT

## 2022-03-11 ENCOUNTER — CLINICAL SUPPORT (OUTPATIENT)
Dept: REHABILITATION | Facility: HOSPITAL | Age: 27
End: 2022-03-11
Payer: COMMERCIAL

## 2022-03-11 DIAGNOSIS — R52 PAIN: Primary | ICD-10-CM

## 2022-03-11 PROCEDURE — 97140 MANUAL THERAPY 1/> REGIONS: CPT | Mod: PN

## 2022-03-11 PROCEDURE — 97035 APP MDLTY 1+ULTRASOUND EA 15: CPT | Mod: PN

## 2022-03-11 PROCEDURE — 97110 THERAPEUTIC EXERCISES: CPT | Mod: PN

## 2022-03-11 NOTE — PROGRESS NOTES
"  [facility]  Occupational Therapy Treatment Note    Date: 3/11/2022  Name: Karen Castillo  Clinic Number: [id number]    Therapy Diagnosis:   Encounter Diagnosis   Name Primary?    Pain Yes     Physician: Adali Paz MD    Physician Orders: Evaluate and treat   Medical Diagnosis: M77.8 (ICD-10-CM) - Right elbow tendonitis   Surgical Procedure and Date: N/A , N/A   Evaluation Date: 2/28/2022  Insurance Authorization Period Expiration: 01/26/2022 - 01/26/2023   Plan of Care Expiration: 02/28/2022 - 05/23/2022  Progress Note Due: 04/08/2022   Date of Return to MD: N/A   Visit # / Visits authorized: 1 / 20  FOTO: 1/3    Precautions:  Standard     Time In: 12:00  Time Out: 12:50  Total Billable Time: 50 minutes (I U/S, 2 MT, 1 TE)    SUBJECTIVE     Pt reports: she noticed she had increased elbow pain 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 She sets her alarm on the hour to incorporate stretches into her day.   She was compliant with home exercise program given last session.   Response to previous treatment:less pain/irritation at elbow with less frequency  Functional change: none reported    Pain: 1/10 pre-treatment.  She reports having elbow "irritation" approximately 1 day of her 6 day work week at this time  Location: right elbow      OBJECTIVE     Objective Measures updated at progress report unless specified.    Treatment     Karen received the treatments listed below:     Supervised modalities after being cleared for contradictions:  Paraffin bath - N/A      Fluidotherapy - N/A    Hot Pack - N/A   Cold Pack - N/A     Direct contact modalities after being cleared for contraindications:   Ultrasound - 1 MHz, .8 W/cm2, 20% pulsed, 10 min to right elbow, to decrease pain & edema, increase circulation and tissue extensibility    Manual therapy techniques: Myofacial release, " Soft tissue Mobilization and Friction Massage were applied to the: right lateral elbow/ dorsal forearm musculature for 23 minutes, including: use of hand techniques, cupping and IASTM tools to increase blood flow/circulation, improve soft tissue pliability and decrease pain. Cross friction massage applied to common extensor tendon.     Therapeutic exercises to develop strength, endurance, ROM and flexibility for 17 minutes, including:    Wrist 3 way stretches  3 X 10 sec hold    Repetitive wrist flexion/ extension with/without fisting  25 reps each *pain at end of exercises    Repetitive gravity eliminated wrist flexion/ extension with/without fisting  5 reps each for HEP education    T bar frowns 10 reps, green *pain   Seated lat stretch  5 X 10 sec hold    Wall slides  20 reps                Therapeutic activities to improve functional performance for 0  minutes, including:      Patient Education and Home Exercises      Education provided:   - additional exercises issued   - Progress towards goals     Written Home Exercises Provided: yes.  Exercises were reviewed and Karen was able to demonstrate them prior to the end of the session.  Karen demonstrated good  understanding of the HEP provided. See EMR under Patient Instructions for exercises provided during therapy sessions.       Assessment   Patient with moderate deep tissue tightness of extensor musculature in forearm.  She had mild complaints of sensitivity with soft tissue mobilization of this area.  Karen has modified and continues to modify her work and home offices to promote proper ergonomics and reports that this is helpful with decreasing her elbow pain.  She verbalizes good awareness of proper body mechanics and understanding of how to modify her work environment/desk set-up to promote best possible ergonomics. Pt would continue to benefit from skilled OT  Decreased  strength, Increased pain and Joint Stiffness.         Karen is progressing well  towards her goals and there are no updates to goals at this time. Pt prognosis is Good.     Pt will continue to benefit from skilled outpatient occupational therapy to address the deficits listed in the problem list on initial evaluation provide pt/family education and to maximize pt's level of independence in the home and community environment.     Pt's spiritual, cultural and educational needs considered and pt agreeable to plan of care and goals.    Anticipated barriers to occupational therapy: compliance with HEP, therapy attendance     Goals:   Short Term Goals: (in 6 weeks)  1) Patient will be independent in HEP-Met   2) Decrease pain in right elbow to no more than 2-3/10 worst in ADL/IADL's -Progressing  3) Patient will increase strength in right wrist extension/ flexion to 5/5 muscle grade to increase functional use for ADLs/IADLs -Progressing  4) Increase right  strength by 3-5 psi for increased functional use -Progressing   5) Patient will tolerate light resistive gripping 3 min with 1 rest breaks to increase functional use for ADLs/IADLs -Progressing         Long Term Goals: (in 12 weeks)  1) Decrease pain in right elbow to no more than 0-1/10 worst in ADL/IADL's  2) Patient will perform repetitive overhead elbow extension task for 3 minutes without rest break to increase functional use for ADLs/IADLs  3) Increase muscular endurance of right wrist musculature by performing repetitive forearm motion/task for 3 minutes with no rest break   4) Patient will tolerate minimally resistive gripping exercise 5 min without rest breaks to increase functional use for ADLs/IADLs   5) Increased functioning in ADL/IADL's, as evidenced by a FOTO impairment rating of no more than 30%         PLAN     Certification Period/Plan of care expiration: 2/28/2022 to 5/23/2022.     Outpatient Occupational Therapy 1 times weekly for 8 weeks to include the following interventions: Fluidotherapy, Manual therapy/joint  mobilizations, Modalities for pain management, US 3 mhz, Therapeutic exercises/activities., Strengthening, Orthotic Fabrication/Fit/Training and work place ergonomics.       Updates/Grading for next session: continue with repetitive non-resistive exercises progressing to resistive as tolerated       Ra Hsu, OT

## 2022-03-29 ENCOUNTER — CLINICAL SUPPORT (OUTPATIENT)
Dept: REHABILITATION | Facility: HOSPITAL | Age: 27
End: 2022-03-29
Payer: COMMERCIAL

## 2022-03-29 DIAGNOSIS — R52 PAIN: Primary | ICD-10-CM

## 2022-03-29 PROCEDURE — 97140 MANUAL THERAPY 1/> REGIONS: CPT | Mod: PN

## 2022-03-29 PROCEDURE — 97035 APP MDLTY 1+ULTRASOUND EA 15: CPT | Mod: PN

## 2022-03-29 PROCEDURE — 97110 THERAPEUTIC EXERCISES: CPT | Mod: PN

## 2022-03-29 NOTE — PROGRESS NOTES
"  [facility]  Occupational Therapy Treatment Note    Date: 3/29/2022  Name: Karen Castillo  Clinic Number: [id number]    Therapy Diagnosis:   Encounter Diagnosis   Name Primary?    Pain Yes     Physician: Adali Paz MD    Physician Orders: Evaluate and treat   Medical Diagnosis: M77.8 (ICD-10-CM) - Right elbow tendonitis   Surgical Procedure and Date: N/A , N/A   Evaluation Date: 2/28/2022  Insurance Authorization Period Expiration: 01/26/2022 - 01/26/2023   Plan of Care Expiration: 02/28/2022 - 05/23/2022  Progress Note Due: 04/08/2022   Date of Return to MD: N/A   Visit # / Visits authorized: 2 / 20 (plus evaluation)   FOTO: 1/3    Precautions:  Standard     Time In: 12:00  Time Out: 12:45  Total Billable Time: 45  minutes (I U/S, 1 MT, 1 TE)    SUBJECTIVE     Pt reports:  She sets her alarm on the hour to incorporate stretches into her work day. She has report of no symptoms of tennis elbow over the last week. Karen would like to continue therapy through the strengthening phase.     She was compliant with home exercise program given last session.   Response to previous treatment:no reports of elbow pain/tenderness   Functional change: completed work week without elbow pain     Pain: 0/10 pre-treatment.  She reports having no elbow tenderness/pain/ or "irritable" feeling  Location: right elbow      OBJECTIVE     Objective Measures updated at progress report unless specified.    Treatment     Karen received the treatments listed below:     Supervised modalities after being cleared for contradictions:  Paraffin bath - N/A      Fluidotherapy - N/A    Hot Pack - N/A   Cold Pack - N/A     Direct contact modalities after being cleared for contraindications:   Ultrasound - 3 MHz, .8 W/cm2, 20% pulsed, 10 min to right elbow, to decrease pain & edema, increase circulation and tissue extensibility    Manual therapy techniques: Myofacial release, Soft tissue Mobilization and Friction " Massage were applied to the: right lateral elbow/ dorsal forearm musculature for 15 minutes, including: use of hand techniques, cupping and IASTM tools to increase blood flow/circulation, improve soft tissue pliability and decrease pain. Cross friction massage applied to common extensor tendon.     Therapeutic exercises to develop strength, endurance, ROM and flexibility for 20 minutes, including:    Wrist 3 way stretches  3 X 10 sec hold    Posterior shoulder stretch 3 X 5 sec hold Overhead tricep stretch 3 X 5 sec hold Wrist flexion/extension 1# 2/10 reps    radial deviation/ ulnar deviation 1# 2/10 reps   Pronation/supination hammer midway  2/10 reps    digiflex red, slow repetitive  1/10 reps    Ball squeeze  1/10 reps      -Issued squeeze ball to begin light resistive repetitive gripping.       Patient Education and Home Exercises      Education provided:   - strengthening HEP issued 3/29/2022  - additional exercises issued   - Progress towards goals     Written Home Exercises Provided: yes.  Exercises were reviewed and Karen was able to demonstrate them prior to the end of the session.  Karen demonstrated good  understanding of the HEP provided. See EMR under Patient Instructions for exercises provided during therapy sessions.       Assessment   Deep tissue tightness of extensor musculature in forearm present, but minimal.  She had no complaints of pain/tenderness with palpation over common extensor or soft tissue surrounding elbow. She is negative for 3rd finger extension test.  Karen has modified her work and home offices to promote proper ergonomics; she has been very pro-active in this area.  She was progressed to light strengthening today and tolerated all above therapeutic exercises well without complaints of increased pain or difficulty.  Pt would continue to benefit from skilled OT  Decreased  strength, Increased pain and Joint Stiffness.         Karen is progressing well towards her goals and  there are no updates to goals at this time. Pt prognosis is Good.     Pt will continue to benefit from skilled outpatient occupational therapy to address the deficits listed in the problem list on initial evaluation provide pt/family education and to maximize pt's level of independence in the home and community environment.     Pt's spiritual, cultural and educational needs considered and pt agreeable to plan of care and goals.    Anticipated barriers to occupational therapy: compliance with HEP, therapy attendance     Goals:   Short Term Goals: (in 6 weeks)  1) Patient will be independent in HEP-Met   2) Decrease pain in right elbow to no more than 2-3/10 worst in ADL/IADL's -Progressing  3) Patient will increase strength in right wrist extension/ flexion to 5/5 muscle grade to increase functional use for ADLs/IADLs -Progressing  4) Increase right  strength by 3-5 psi for increased functional use -Progressing   5) Patient will tolerate light resistive gripping 3 min with 1 rest breaks to increase functional use for ADLs/IADLs -Progressing         Long Term Goals: (in 12 weeks)  1) Decrease pain in right elbow to no more than 0-1/10 worst in ADL/IADL's  2) Patient will perform repetitive overhead elbow extension task for 3 minutes without rest break to increase functional use for ADLs/IADLs  3) Increase muscular endurance of right wrist musculature by performing repetitive forearm motion/task for 3 minutes with no rest break   4) Patient will tolerate minimally resistive gripping exercise 5 min without rest breaks to increase functional use for ADLs/IADLs   5) Increased functioning in ADL/IADL's, as evidenced by a FOTO impairment rating of no more than 30%         PLAN     Certification Period/Plan of care expiration: 2/28/2022 to 5/23/2022.     Outpatient Occupational Therapy 1 times weekly for 8 weeks to include the following interventions: Fluidotherapy, Manual therapy/joint mobilizations, Modalities for pain  management, US 3 mhz, Therapeutic exercises/activities., Strengthening, Orthotic Fabrication/Fit/Training and work place ergonomics.       Updates/Grading for next session: continue with repetitive non-resistive exercises progressing to resistive as tolerated       Ra Hsu, OT

## 2022-03-29 NOTE — PATIENT INSTRUCTIONS
Issued Via [email or web address] (see logo above) scan QR code for pt specific program          View at [email or web address] using code: [email or web address]

## 2022-04-22 ENCOUNTER — CLINICAL SUPPORT (OUTPATIENT)
Dept: REHABILITATION | Facility: HOSPITAL | Age: 27
End: 2022-04-22
Payer: COMMERCIAL

## 2022-04-22 DIAGNOSIS — R52 PAIN: Primary | ICD-10-CM

## 2022-04-22 PROCEDURE — 97110 THERAPEUTIC EXERCISES: CPT | Mod: PN

## 2022-04-22 PROCEDURE — 97140 MANUAL THERAPY 1/> REGIONS: CPT | Mod: PN

## 2022-04-22 NOTE — PROGRESS NOTES
[facility]  Occupational Therapy Treatment Note    Date: 4/22/2022  Name: Karen Castillo  Clinic Number: [id number]    Therapy Diagnosis:   Encounter Diagnosis   Name Primary?    Pain Yes     Physician: Adali Paz MD    Physician Orders: Evaluate and treat   Medical Diagnosis: M77.8 (ICD-10-CM) - Right elbow tendonitis   Surgical Procedure and Date: N/A , N/A   Evaluation Date: 2/28/2022  Insurance Authorization Period Expiration: 01/26/2022 - 01/26/2023   Plan of Care Expiration: 02/28/2022 - 05/23/2022  Progress Note Due: 04/08/2022   Date of Return to MD: N/A   Visit # / Visits authorized: 3/20 (plus evaluation)   FOTO: 1/3    Precautions:  Standard     Time In: 10:05  Time Out: 11:50  Total Billable Time: 35 minutes (1 MT, 2 TE)    SUBJECTIVE     Pt reports:  Her vertical mouse broke and she noticed increased irritation with the use of a regular mouse.  She purchased a new mouse, and the irritation has decreased.  Karen continues to set her alarm on the hour to incorporate stretches into her work day.    She was compliant with home exercise program given last session.   Response to previous treatment: no elbow pain/tenderness   Functional change: able to complete work week without pain      Pain: 0/10 pre-treatment.     Location: right elbow      OBJECTIVE     Objective Measures updated at progress report unless specified.    Treatment     Karen received the treatments listed below:     Supervised modalities after being cleared for contradictions:  Paraffin bath - N/A      Fluidotherapy - N/A    Hot Pack - Patient received MH x 10 min to left elbow to increase blood flow, circulation and tissue elasticity prior to therex   Cold Pack - N/A     Direct contact modalities after being cleared for contraindications:       Manual therapy techniques: Myofacial release, Soft tissue Mobilization and Friction Massage were applied to the: right lateral elbow/ dorsal forearm  musculature for 10 minutes, including: use of hand techniques, cupping and IASTM tools to increase blood flow/circulation, improve soft tissue pliability and decrease pain.      Therapeutic exercises to develop strength, endurance, ROM and flexibility for 25 minutes, including:    Wrist 3 way stretches  3 X 10 sec hold    Posterior shoulder stretch 3 X 5 sec hold Overhead tricep stretch 3 X 5 sec hold Wall slides  30 reps    Wrist flexion/extension 1# 2/10 reps    radial deviation/ ulnar deviation 1# 2/10 reps    Supinator 1#  2/10 reps        Gripping with digiflex red     - steady rate, elbow extended, neutral    2/10 reps     - steady rate, elbow bent supination/ pronation  2/10 reps each    -Issued squeeze ball to begin light resistive repetitive gripping 3/29/2022.      Patient Education and Home Exercises      Education provided:   - strengthening HEP issued 3/29/2022  - additional exercises issued   - Progress towards goals     Written Home Exercises Provided: yes.  Exercises were reviewed and Karen was able to demonstrate them prior to the end of the session.  Karen demonstrated good  understanding of the HEP provided. See EMR under Patient Instructions for exercises provided during therapy sessions.       Assessment   Deep tissue tightness of extensor musculature is still minimal.  She is tolerating progressive light strengthening therapeutic exercises well without increase in pain or without difficulty.  Karen continues to be pro-active with modification of her work and home offices to promote proper ergonomics.  Pt would continue to benefit from skilled OT  Decreased  strength, Increased pain and Joint Stiffness.         Karen is progressing well towards her goals and there are no updates to goals at this time. Pt prognosis is Good.     Pt will continue to benefit from skilled outpatient occupational therapy to address the deficits listed in the problem list on initial evaluation provide pt/family  education and to maximize pt's level of independence in the home and community environment.     Pt's spiritual, cultural and educational needs considered and pt agreeable to plan of care and goals.    Anticipated barriers to occupational therapy: compliance with HEP, therapy attendance     Goals:   Short Term Goals: (in 6 weeks)  1) Patient will be independent in HEP-Met 3/11/2022  2) Decrease pain in right elbow to no more than 2-3/10 worst in ADL/IADL's -Met 4/22/2022  3) Patient will increase strength in right wrist extension/ flexion to 5/5 muscle grade to increase functional use for ADLs/IADLs -Progressing  4) Increase right  strength by 3-5 psi for increased functional use -Progressing   5) Patient will tolerate light resistive gripping 3 min with 1 rest breaks to increase functional use for ADLs/IADLs -Progressing         Long Term Goals: (in 12 weeks)  1) Decrease pain in right elbow to no more than 0-1/10 worst in ADL/IADL's  2) Patient will perform repetitive overhead elbow extension task for 3 minutes without rest break to increase functional use for ADLs/IADLs  3) Increase muscular endurance of right wrist musculature by performing repetitive forearm motion/task for 3 minutes with no rest break   4) Patient will tolerate minimally resistive gripping exercise 5 min without rest breaks to increase functional use for ADLs/IADLs   5) Increased functioning in ADL/IADL's, as evidenced by a FOTO impairment rating of no more than 30%         PLAN     Certification Period/Plan of care expiration: 2/28/2022 to 5/23/2022.     Outpatient Occupational Therapy 1 times weekly for 8 weeks to include the following interventions: Fluidotherapy, Manual therapy/joint mobilizations, Modalities for pain management, US 3 mhz, Therapeutic exercises/activities., Strengthening, Orthotic Fabrication/Fit/Training and work place ergonomics.       Updates/Grading for next session: continue with progressive strengthening exercises  as tolerated       Ra Hsu, OT

## 2022-05-25 ENCOUNTER — DOCUMENTATION ONLY (OUTPATIENT)
Dept: REHABILITATION | Facility: HOSPITAL | Age: 27
End: 2022-05-25
Payer: COMMERCIAL

## 2022-05-25 PROBLEM — R52 PAIN: Status: RESOLVED | Noted: 2022-02-28 | Resolved: 2022-05-25

## 2022-05-25 NOTE — PROGRESS NOTES
[facility]   Discharge Note    Name: Karen Castillo  Clinic Number: [id number]    Therapy Diagnosis:        Encounter Diagnosis   Name Primary?    Pain Yes      Physician: Adali Paz MD     Physician Orders: Evaluate and treat   Medical Diagnosis: M77.8 (ICD-10-CM) - Right elbow tendonitis   Evaluation Date: 2/28/2022         Date of Last visit: 4/22/2022     Total Visits Received: 3        ASSESSMENT    From 4/22/2022 note:   Deep tissue tightness of extensor musculature is still minimal.  She is tolerating progressive light strengthening therapeutic exercises well without increase in pain or without difficulty.  Karen continues to be pro-active with modification of her work and home offices to promote proper ergonomics.  Pt would continue to benefit from skilled OT  Decreased  strength, Increased pain and Joint Stiffness.         Discharge reason: Patient has not attended therapy since 4/22/2022    Discharge FOTO Score: N/A      Goals:   Short Term Goals: (in 6 weeks)  1) Patient will be independent in HEP-Met 3/11/2022  2) Decrease pain in right elbow to no more than 2-3/10 worst in ADL/IADL's -Met 4/22/2022  3) Patient will increase strength in right wrist extension/ flexion to 5/5 muscle grade to increase functional use for ADLs/IADLs -Progressing  4) Increase right  strength by 3-5 psi for increased functional use -Progressing   5) Patient will tolerate light resistive gripping 3 min with 1 rest breaks to increase functional use for ADLs/IADLs -Progressing         Long Term Goals: (in 12 weeks)  1) Decrease pain in right elbow to no more than 0-1/10 worst in ADL/IADL's  2) Patient will perform repetitive overhead elbow extension task for 3 minutes without rest break to increase functional use for ADLs/IADLs  3) Increase muscular endurance of right wrist musculature by performing repetitive forearm motion/task for 3 minutes with no rest break   4) Patient will  tolerate minimally resistive gripping exercise 5 min without rest breaks to increase functional use for ADLs/IADLs   5) Increased functioning in ADL/IADL's, as evidenced by a FOTO impairment rating of no more than 30%         PLAN   This patient is discharged from Occupational Therapy      Ra Hsu, OT

## 2023-08-02 ENCOUNTER — PATIENT OUTREACH (OUTPATIENT)
Dept: ADMINISTRATIVE | Facility: HOSPITAL | Age: 28
End: 2023-08-02
Payer: COMMERCIAL

## 2023-09-12 ENCOUNTER — LAB VISIT (OUTPATIENT)
Dept: LAB | Facility: HOSPITAL | Age: 28
End: 2023-09-12
Attending: FAMILY MEDICINE
Payer: COMMERCIAL

## 2023-09-12 ENCOUNTER — OFFICE VISIT (OUTPATIENT)
Dept: PRIMARY CARE CLINIC | Facility: CLINIC | Age: 28
End: 2023-09-12
Payer: COMMERCIAL

## 2023-09-12 VITALS
HEART RATE: 84 BPM | HEIGHT: 67 IN | OXYGEN SATURATION: 97 % | BODY MASS INDEX: 27.68 KG/M2 | WEIGHT: 176.38 LBS | TEMPERATURE: 99 F

## 2023-09-12 DIAGNOSIS — G43.109 MIGRAINE WITH AURA AND WITHOUT STATUS MIGRAINOSUS, NOT INTRACTABLE: ICD-10-CM

## 2023-09-12 DIAGNOSIS — Z00.00 ROUTINE GENERAL MEDICAL EXAMINATION AT A HEALTH CARE FACILITY: ICD-10-CM

## 2023-09-12 DIAGNOSIS — Z00.00 ROUTINE GENERAL MEDICAL EXAMINATION AT A HEALTH CARE FACILITY: Primary | ICD-10-CM

## 2023-09-12 PROBLEM — Z90.79 STATUS POST BILATERAL SALPINGECTOMY: Status: ACTIVE | Noted: 2023-09-12

## 2023-09-12 LAB
ALBUMIN SERPL BCP-MCNC: 4.5 G/DL (ref 3.5–5.2)
ALP SERPL-CCNC: 71 U/L (ref 55–135)
ALT SERPL W/O P-5'-P-CCNC: 96 U/L (ref 10–44)
ANION GAP SERPL CALC-SCNC: 8 MMOL/L (ref 8–16)
AST SERPL-CCNC: 58 U/L (ref 10–40)
BASOPHILS # BLD AUTO: 0.03 K/UL (ref 0–0.2)
BASOPHILS NFR BLD: 0.4 % (ref 0–1.9)
BILIRUB SERPL-MCNC: 0.9 MG/DL (ref 0.1–1)
BUN SERPL-MCNC: 11 MG/DL (ref 6–20)
CALCIUM SERPL-MCNC: 9.7 MG/DL (ref 8.7–10.5)
CHLORIDE SERPL-SCNC: 105 MMOL/L (ref 95–110)
CHOLEST SERPL-MCNC: 150 MG/DL (ref 120–199)
CHOLEST/HDLC SERPL: 3.1 {RATIO} (ref 2–5)
CO2 SERPL-SCNC: 25 MMOL/L (ref 23–29)
CREAT SERPL-MCNC: 0.8 MG/DL (ref 0.5–1.4)
DIFFERENTIAL METHOD: NORMAL
EOSINOPHIL # BLD AUTO: 0.2 K/UL (ref 0–0.5)
EOSINOPHIL NFR BLD: 2.1 % (ref 0–8)
ERYTHROCYTE [DISTWIDTH] IN BLOOD BY AUTOMATED COUNT: 13.1 % (ref 11.5–14.5)
EST. GFR  (NO RACE VARIABLE): >60 ML/MIN/1.73 M^2
GLUCOSE SERPL-MCNC: 84 MG/DL (ref 70–110)
HCT VFR BLD AUTO: 40.5 % (ref 37–48.5)
HDLC SERPL-MCNC: 48 MG/DL (ref 40–75)
HDLC SERPL: 32 % (ref 20–50)
HGB BLD-MCNC: 13.1 G/DL (ref 12–16)
IMM GRANULOCYTES # BLD AUTO: 0.02 K/UL (ref 0–0.04)
IMM GRANULOCYTES NFR BLD AUTO: 0.3 % (ref 0–0.5)
LDLC SERPL CALC-MCNC: 84.8 MG/DL (ref 63–159)
LYMPHOCYTES # BLD AUTO: 2 K/UL (ref 1–4.8)
LYMPHOCYTES NFR BLD: 28.4 % (ref 18–48)
MCH RBC QN AUTO: 29.4 PG (ref 27–31)
MCHC RBC AUTO-ENTMCNC: 32.3 G/DL (ref 32–36)
MCV RBC AUTO: 91 FL (ref 82–98)
MONOCYTES # BLD AUTO: 0.9 K/UL (ref 0.3–1)
MONOCYTES NFR BLD: 12.3 % (ref 4–15)
NEUTROPHILS # BLD AUTO: 4 K/UL (ref 1.8–7.7)
NEUTROPHILS NFR BLD: 56.5 % (ref 38–73)
NONHDLC SERPL-MCNC: 102 MG/DL
NRBC BLD-RTO: 0 /100 WBC
PLATELET # BLD AUTO: 298 K/UL (ref 150–450)
PMV BLD AUTO: 9.7 FL (ref 9.2–12.9)
POTASSIUM SERPL-SCNC: 4.5 MMOL/L (ref 3.5–5.1)
PROT SERPL-MCNC: 7.8 G/DL (ref 6–8.4)
RBC # BLD AUTO: 4.45 M/UL (ref 4–5.4)
SODIUM SERPL-SCNC: 138 MMOL/L (ref 136–145)
TRIGL SERPL-MCNC: 86 MG/DL (ref 30–150)
WBC # BLD AUTO: 7.15 K/UL (ref 3.9–12.7)

## 2023-09-12 PROCEDURE — 80053 COMPREHEN METABOLIC PANEL: CPT | Performed by: FAMILY MEDICINE

## 2023-09-12 PROCEDURE — 36415 COLL VENOUS BLD VENIPUNCTURE: CPT | Mod: PN | Performed by: FAMILY MEDICINE

## 2023-09-12 PROCEDURE — 3008F BODY MASS INDEX DOCD: CPT | Mod: CPTII,S$GLB,, | Performed by: FAMILY MEDICINE

## 2023-09-12 PROCEDURE — 99999 PR PBB SHADOW E&M-EST. PATIENT-LVL III: CPT | Mod: PBBFAC,,, | Performed by: FAMILY MEDICINE

## 2023-09-12 PROCEDURE — 3008F PR BODY MASS INDEX (BMI) DOCUMENTED: ICD-10-PCS | Mod: CPTII,S$GLB,, | Performed by: FAMILY MEDICINE

## 2023-09-12 PROCEDURE — 99999 PR PBB SHADOW E&M-EST. PATIENT-LVL III: ICD-10-PCS | Mod: PBBFAC,,, | Performed by: FAMILY MEDICINE

## 2023-09-12 PROCEDURE — 1160F RVW MEDS BY RX/DR IN RCRD: CPT | Mod: CPTII,S$GLB,, | Performed by: FAMILY MEDICINE

## 2023-09-12 PROCEDURE — 80061 LIPID PANEL: CPT | Performed by: FAMILY MEDICINE

## 2023-09-12 PROCEDURE — 85025 COMPLETE CBC W/AUTO DIFF WBC: CPT | Performed by: FAMILY MEDICINE

## 2023-09-12 PROCEDURE — 99395 PR PREVENTIVE VISIT,EST,18-39: ICD-10-PCS | Mod: S$GLB,,, | Performed by: FAMILY MEDICINE

## 2023-09-12 PROCEDURE — 1159F PR MEDICATION LIST DOCUMENTED IN MEDICAL RECORD: ICD-10-PCS | Mod: CPTII,S$GLB,, | Performed by: FAMILY MEDICINE

## 2023-09-12 PROCEDURE — 1160F PR REVIEW ALL MEDS BY PRESCRIBER/CLIN PHARMACIST DOCUMENTED: ICD-10-PCS | Mod: CPTII,S$GLB,, | Performed by: FAMILY MEDICINE

## 2023-09-12 PROCEDURE — 99395 PREV VISIT EST AGE 18-39: CPT | Mod: S$GLB,,, | Performed by: FAMILY MEDICINE

## 2023-09-12 PROCEDURE — 1159F MED LIST DOCD IN RCRD: CPT | Mod: CPTII,S$GLB,, | Performed by: FAMILY MEDICINE

## 2023-09-12 RX ORDER — RIZATRIPTAN BENZOATE 5 MG/1
5 TABLET, ORALLY DISINTEGRATING ORAL
Qty: 9 TABLET | Refills: 3 | Status: SHIPPED | OUTPATIENT
Start: 2023-09-12

## 2023-09-12 NOTE — PROGRESS NOTES
Subjective:      Patient ID: Karen Castillo is a 28 y.o. female.    Chief Complaint: Migraine      Patient is a 28 y.o. female coming in today for migraines.   Reports she has had intermittent migraines for a while but have worsened for the past 6 months and have presented aura with associated sx of visual disturbance. States sx hinder her job performance. Has been taking OTC medication for migraine treatment as well as occasional use of CBD products. Pt stopped birth control 2 years ago. Hx of bilateral tubal ligation. Pt regularly f/u with OBGYN. Reports normal bowel movements. No other health concern at this time.     1. Routine general medical examination at a health care facility    2. Migraine with aura and without status migrainosus, not intractable       [facility] Reason For Visit    9/9/2023  2:35 PM CDT - Filed by Patient   What is your primary reason for visit? Other/Annual   Have you experienced any of the following:   Change in activity? No   Unexpected weight change? No   Neck pain? No   Hearing loss? No   Runny nose? No   Trouble swallowing? No   Eye discharge? No   Changes in vision? No   Chest tightness? No   Wheezing? No   Chest pain? No   Heart beating fast or racing? No   Blood in stool? No   Constipation? No   Vomiting? No   Diarrhea? No   Drinking much more than usual? No   Urinating much more than usual? No   Difficulty urinating? No   Blood in the urine? No   Menstrual problem? No   Painful urination? No   Joint swelling? No   Joint pain? No   Headaches? Yes   Weakness? No   Confusion? No   Feeling depressed? No         Pmh, Psh, Family Hx, Social Hx, HM updated in Epic Tabs today.   Review of Systems   Constitutional:  Negative for activity change and unexpected weight change.   HENT:  Negative for hearing loss, rhinorrhea and trouble swallowing.    Eyes:  Positive for visual disturbance. Negative for discharge.   Respiratory:  Negative for chest tightness and wheezing.   "  Cardiovascular:  Negative for chest pain and palpitations.   Gastrointestinal:  Negative for blood in stool, constipation, diarrhea and vomiting.   Endocrine: Negative for polydipsia and polyuria.   Genitourinary:  Negative for difficulty urinating, dysuria, hematuria and menstrual problem.   Musculoskeletal:  Negative for arthralgias, joint swelling and neck pain.   Neurological:  Positive for headaches. Negative for weakness.   Psychiatric/Behavioral:  Negative for confusion and dysphoric mood.      Objective:     Vitals:    09/12/23 0844   Pulse: 84   Temp: 98.7 °F (37.1 °C)   SpO2: 97%   Weight: 80 kg (176 lb 5.9 oz)   Height: 5' 7" (1.702 m)     Wt Readings from Last 10 Encounters:   09/12/23 80 kg (176 lb 5.9 oz)   11/03/22 76.7 kg (169 lb)   01/26/22 74.3 kg (163 lb 12.8 oz)   10/21/21 71.2 kg (157 lb)     Physical Exam  Vitals reviewed.   Constitutional:       Appearance: Normal appearance. She is well-developed and normal weight.   HENT:      Head: Normocephalic and atraumatic.      Right Ear: Tympanic membrane and external ear normal.      Left Ear: Tympanic membrane and external ear normal.      Nose: Nose normal.      Mouth/Throat:      Mouth: Mucous membranes are moist.      Pharynx: Oropharynx is clear.   Eyes:      Conjunctiva/sclera: Conjunctivae normal.      Pupils: Pupils are equal, round, and reactive to light.   Neck:      Thyroid: No thyromegaly.   Cardiovascular:      Rate and Rhythm: Normal rate and regular rhythm.      Heart sounds: Normal heart sounds. No murmur heard.     No friction rub. No gallop.   Pulmonary:      Effort: Pulmonary effort is normal. No respiratory distress.      Breath sounds: Normal breath sounds. No wheezing or rales.   Abdominal:      General: Bowel sounds are normal. There is no distension.      Palpations: Abdomen is soft.      Tenderness: There is no abdominal tenderness. There is no rebound.   Musculoskeletal:         General: Normal range of motion.      " Cervical back: Normal range of motion and neck supple.   Lymphadenopathy:      Cervical: No cervical adenopathy.   Skin:     General: Skin is warm and dry.      Findings: No rash.   Neurological:      Mental Status: She is alert and oriented to person, place, and time.   Psychiatric:         Attention and Perception: Attention and perception normal.         Mood and Affect: Mood and affect normal.         Speech: Speech normal.         Behavior: Behavior normal.         Thought Content: Thought content normal.         Cognition and Memory: Cognition and memory normal.         Judgment: Judgment normal.       Assessment:     1. Routine general medical examination at a health care facility    2. Migraine with aura and without status migrainosus, not intractable        Plan:   Karen was seen today for migraine.    Diagnoses and all orders for this visit:    Routine general medical examination at a health care facility  -     Lipid Panel; Future  -     Comprehensive Metabolic Panel; Future  -     CBC Auto Differential; Future    Migraine with aura and without status migrainosus, not intractable  -     rizatriptan (MAXALT-MLT) 5 MG disintegrating tablet; Take 1 tablet (5 mg total) by mouth as needed for Migraine (max dose 4 in 24 hrs). May repeat in 2 hours if needed      Migraine is - New Problem, discussed symptoms, work up, suspected diagnosis.  New Rx Maxalt 5 mg PRN for migraine treatment.   Instructed to keep headache journal and to use Magnesium supplement nightly for migraine treatment.   Instructed to continue f/u with OBGYN.   Lab work ordered to be completed today to rule out any electrolyte abnormality.   Instructed to f/u in 1 year or in 2 months if sx persist or worsen.     Patient Instructions   Magnesium as supplement- mag glycinate     Follow up in about 1 year (around 9/12/2024) for physical with Dr HENDRICKSON.      LABS:   Lab Results   Component Value Date    HGBA1C 5.0 01/26/2022      Lab Results    Component Value Date    CHOL 144 01/26/2022     Lab Results   Component Value Date    LDLCALC 67.6 01/26/2022     Lab Results   Component Value Date    CHOL 144 01/26/2022    TRIG 87 01/26/2022    HDL 59 01/26/2022    HGBA1C 5.0 01/26/2022       The ASCVD Risk score (Shan DK, et al., 2019) failed to calculate for the following reasons:    The 2019 ASCVD risk score is only valid for ages 40 to 79  No image results found.    Scribe Attestation:   I, Fabien Dietz, am scribing for, and in the presence of, Dr. Adali Paz MD. I performed the above scribed service and the documentation accurately describes the services I performed. I attest to the accuracy of the note.    I, Dr. Adali Paz MD, reviewed documentation as scribed above. I personally performed the services described in this documentation.  I agree that the record reflects my personal performance and is accurate and complete. Adali Paz MD.    09/12/2023

## 2023-09-21 DIAGNOSIS — R74.8 ELEVATED LIVER ENZYMES: Primary | ICD-10-CM

## 2023-09-21 NOTE — PROGRESS NOTES
Patient's liver function tests are elevated. Need to obtain additional lab tests and schedule liver ultrasound. Advise patient of need for additional testing.

## 2023-10-18 ENCOUNTER — HOSPITAL ENCOUNTER (OUTPATIENT)
Dept: RADIOLOGY | Facility: HOSPITAL | Age: 28
Discharge: HOME | End: 2023-10-18
Attending: FAMILY MEDICINE
Payer: COMMERCIAL

## 2023-10-18 DIAGNOSIS — R74.8 ELEVATED LIVER ENZYMES: ICD-10-CM

## 2023-10-18 PROCEDURE — 76705 ECHO EXAM OF ABDOMEN: CPT | Mod: 26,,, | Performed by: RADIOLOGY

## 2023-10-18 PROCEDURE — 76705 US ABDOMEN LIMITED: ICD-10-PCS | Mod: 26,,, | Performed by: RADIOLOGY

## 2023-10-18 PROCEDURE — 76705 ECHO EXAM OF ABDOMEN: CPT | Mod: TC

## 2023-10-19 NOTE — PROGRESS NOTES
No gall stones. Liver is normal. No masses in liver. Abdominal u/s is normal. Please let me know if you have further questions.    Sincerely,   Adali Paz MD

## 2025-01-14 ENCOUNTER — LAB VISIT (OUTPATIENT)
Dept: LAB | Facility: HOSPITAL | Age: 30
End: 2025-01-14
Attending: FAMILY MEDICINE
Payer: COMMERCIAL

## 2025-01-14 ENCOUNTER — OFFICE VISIT (OUTPATIENT)
Dept: PRIMARY CARE CLINIC | Facility: CLINIC | Age: 30
End: 2025-01-14
Payer: COMMERCIAL

## 2025-01-14 VITALS
DIASTOLIC BLOOD PRESSURE: 68 MMHG | WEIGHT: 185.5 LBS | OXYGEN SATURATION: 98 % | SYSTOLIC BLOOD PRESSURE: 118 MMHG | HEART RATE: 62 BPM | RESPIRATION RATE: 18 BRPM | BODY MASS INDEX: 29.06 KG/M2

## 2025-01-14 DIAGNOSIS — G43.109 MIGRAINE WITH AURA AND WITHOUT STATUS MIGRAINOSUS, NOT INTRACTABLE: ICD-10-CM

## 2025-01-14 DIAGNOSIS — R63.5 ABNORMAL WEIGHT GAIN: ICD-10-CM

## 2025-01-14 DIAGNOSIS — Z00.00 ROUTINE GENERAL MEDICAL EXAMINATION AT A HEALTH CARE FACILITY: ICD-10-CM

## 2025-01-14 DIAGNOSIS — Z00.00 ROUTINE GENERAL MEDICAL EXAMINATION AT A HEALTH CARE FACILITY: Primary | ICD-10-CM

## 2025-01-14 DIAGNOSIS — R06.83 SNORING: ICD-10-CM

## 2025-01-14 DIAGNOSIS — Z12.4 ENCOUNTER FOR SCREENING FOR CERVICAL CANCER: ICD-10-CM

## 2025-01-14 LAB
ALBUMIN SERPL BCP-MCNC: 4.4 G/DL (ref 3.5–5.2)
ALP SERPL-CCNC: 67 U/L (ref 40–150)
ALT SERPL W/O P-5'-P-CCNC: 18 U/L (ref 10–44)
ANION GAP SERPL CALC-SCNC: 11 MMOL/L (ref 8–16)
AST SERPL-CCNC: 20 U/L (ref 10–40)
BILIRUB SERPL-MCNC: 0.6 MG/DL (ref 0.1–1)
BUN SERPL-MCNC: 19 MG/DL (ref 6–20)
CALCIUM SERPL-MCNC: 9.9 MG/DL (ref 8.7–10.5)
CHLORIDE SERPL-SCNC: 109 MMOL/L (ref 95–110)
CHOLEST SERPL-MCNC: 148 MG/DL (ref 120–199)
CHOLEST/HDLC SERPL: 3.3 {RATIO} (ref 2–5)
CO2 SERPL-SCNC: 23 MMOL/L (ref 23–29)
CREAT SERPL-MCNC: 0.8 MG/DL (ref 0.5–1.4)
ERYTHROCYTE [DISTWIDTH] IN BLOOD BY AUTOMATED COUNT: 13.1 % (ref 11.5–14.5)
EST. GFR  (NO RACE VARIABLE): >60 ML/MIN/1.73 M^2
ESTIMATED AVG GLUCOSE: 103 MG/DL (ref 68–131)
GLUCOSE SERPL-MCNC: 93 MG/DL (ref 70–110)
HBA1C MFR BLD: 5.2 % (ref 4–5.6)
HCT VFR BLD AUTO: 41.4 % (ref 37–48.5)
HDLC SERPL-MCNC: 45 MG/DL (ref 40–75)
HDLC SERPL: 30.4 % (ref 20–50)
HGB BLD-MCNC: 13.2 G/DL (ref 12–16)
LDLC SERPL CALC-MCNC: 80.2 MG/DL (ref 63–159)
MCH RBC QN AUTO: 29.8 PG (ref 27–31)
MCHC RBC AUTO-ENTMCNC: 31.9 G/DL (ref 32–36)
MCV RBC AUTO: 94 FL (ref 82–98)
NONHDLC SERPL-MCNC: 103 MG/DL
PLATELET # BLD AUTO: 336 K/UL (ref 150–450)
PMV BLD AUTO: 10 FL (ref 9.2–12.9)
POTASSIUM SERPL-SCNC: 4.8 MMOL/L (ref 3.5–5.1)
PROT SERPL-MCNC: 8.1 G/DL (ref 6–8.4)
RBC # BLD AUTO: 4.43 M/UL (ref 4–5.4)
SODIUM SERPL-SCNC: 143 MMOL/L (ref 136–145)
T4 FREE SERPL-MCNC: 0.81 NG/DL (ref 0.71–1.51)
TRIGL SERPL-MCNC: 114 MG/DL (ref 30–150)
TSH SERPL DL<=0.005 MIU/L-ACNC: 2.52 UIU/ML (ref 0.4–4)
WBC # BLD AUTO: 6.61 K/UL (ref 3.9–12.7)

## 2025-01-14 PROCEDURE — 80061 LIPID PANEL: CPT | Performed by: FAMILY MEDICINE

## 2025-01-14 PROCEDURE — 99395 PREV VISIT EST AGE 18-39: CPT | Mod: S$GLB,,, | Performed by: FAMILY MEDICINE

## 2025-01-14 PROCEDURE — 3078F DIAST BP <80 MM HG: CPT | Mod: CPTII,S$GLB,, | Performed by: FAMILY MEDICINE

## 2025-01-14 PROCEDURE — 84443 ASSAY THYROID STIM HORMONE: CPT | Performed by: FAMILY MEDICINE

## 2025-01-14 PROCEDURE — 36415 COLL VENOUS BLD VENIPUNCTURE: CPT | Mod: PN | Performed by: FAMILY MEDICINE

## 2025-01-14 PROCEDURE — 99999 PR PBB SHADOW E&M-EST. PATIENT-LVL III: CPT | Mod: PBBFAC,,, | Performed by: FAMILY MEDICINE

## 2025-01-14 PROCEDURE — 84439 ASSAY OF FREE THYROXINE: CPT | Performed by: FAMILY MEDICINE

## 2025-01-14 PROCEDURE — 80053 COMPREHEN METABOLIC PANEL: CPT | Performed by: FAMILY MEDICINE

## 2025-01-14 PROCEDURE — 1159F MED LIST DOCD IN RCRD: CPT | Mod: CPTII,S$GLB,, | Performed by: FAMILY MEDICINE

## 2025-01-14 PROCEDURE — 85027 COMPLETE CBC AUTOMATED: CPT | Performed by: FAMILY MEDICINE

## 2025-01-14 PROCEDURE — 3074F SYST BP LT 130 MM HG: CPT | Mod: CPTII,S$GLB,, | Performed by: FAMILY MEDICINE

## 2025-01-14 PROCEDURE — 3008F BODY MASS INDEX DOCD: CPT | Mod: CPTII,S$GLB,, | Performed by: FAMILY MEDICINE

## 2025-01-14 PROCEDURE — 1160F RVW MEDS BY RX/DR IN RCRD: CPT | Mod: CPTII,S$GLB,, | Performed by: FAMILY MEDICINE

## 2025-01-14 PROCEDURE — 83036 HEMOGLOBIN GLYCOSYLATED A1C: CPT | Performed by: FAMILY MEDICINE

## 2025-01-14 RX ORDER — RIZATRIPTAN BENZOATE 5 MG/1
5 TABLET, ORALLY DISINTEGRATING ORAL
Qty: 9 TABLET | Refills: 3 | Status: SHIPPED | OUTPATIENT
Start: 2025-01-14

## 2025-01-14 NOTE — PROGRESS NOTES
Chief Complaint  Chief Complaint   Patient presents with    Annual Exam       HPI  Karen Castillo is a 29 y.o. female with multiple medical diagnoses as listed in the medical history and problem list that presents for  in person visit.     History of Present Illness    CHIEF COMPLAINT:  - Patient presents for an annual wellness exam and to discuss recent snoring concerns.    HPI:  Patient, a 29-year-old female, presents for her annual wellness exam. She reports a history of migraines, which have significantly improved with lifestyle changes. Her headaches have resolved due to increased water intake, magnesium supplementation, and improved sleep habits. She has not needed to use the prescribed Maxalt for abortive therapy.    Patient reports a new concern of snoring, ongoing for approximately 2 years but worsening in the past year according to her . She describes feeling like her tongue obstructs her breathing upon waking. The snoring appears to be positional, occurring primarily when she sleeps supine. She denies knowledge of apneic episodes during sleep. She is currently at her highest weight, which may be contributing to the snoring issue.    Patient has recently increased her physical activity. She is currently training for a 5K race and has resumed running, which she did before college. She is also engaged in weight training. Despite increased activity, she reports weight gain. She has been implementing strategies to manage this, including meal prepping with a focus on protein, fiber, and carbohydrate balance, as well as increased water intake.    Patient reports recovering from a cold in the past week but denies significant nasal congestion or stuffiness outside of illness. She denies current migraine symptoms.    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  To begin, press Let's Begin and then press Continue. Let's Begin   On average, how many days per week do you engage in moderate to strenuous exercise (like a brisk walk)? 3 days   On average, how many minutes do you engage in exercise at this level? 60 min   Do you feel stress - tense, restless, nervous, or anxious, or unable to sleep at night because your mind is troubled all the time - these days? Only a little   How often do you feel lonely or isolated from those around you? Sometimes   How often do you need to have someone help you when you read instructions, pamphlets, or other written material from your doctor or pharmacy? Never   How hard is it for you to pay for the very basics like food, housing, medical care, and heating? Not hard at all   In the past 12 months has the electric, gas, oil, or water company threatened to shut off services in your home? No   Within the past 12 months, you worried that your food would run out before you got the money to buy more. Never true   Within the past 12 months, the food you bought just didn't last and you didn't have money to get more. Never true   Has the lack of transportation kept you from medical appointments, meetings, work or from getting things needed for daily living? No   In the last 12 months, was there a time when you were not able to pay the mortgage or rent on time? No   In the last 12 months, was there a time when you did not have a steady place to sleep or slept in a shelter (including now)? No   How often do you have a drink containing alcohol? Monthly or less   How many drinks  containing alcohol do you have on a typical day when you are drinking? 1 or 2   How often do you have six or more drinks on one occasion? Never            Pmh, Psh, Family Hx, Social Hx, HM updated in Epic Tabs today.    Review of Systems   Constitutional:  Positive for activity change. Negative for appetite change and fatigue.   Respiratory:  Negative for cough and shortness of breath.    Cardiovascular:  Negative for chest pain and palpitations.   Gastrointestinal:  Negative for abdominal distention and abdominal pain.   Musculoskeletal:  Negative for arthralgias, gait problem and myalgias.   Psychiatric/Behavioral:  Negative for dysphoric mood and sleep disturbance. The patient is not nervous/anxious.         Objective:     Vitals:    01/14/25 0742   BP: 118/68   Patient Position: Sitting   Pulse: 62   Resp: 18   SpO2: 98%   Weight: 84.1 kg (185 lb 8.3 oz)     Wt Readings from Last 10 Encounters:   01/14/25 84.1 kg (185 lb 8.3 oz)   09/12/23 80 kg (176 lb 5.9 oz)   11/03/22 76.7 kg (169 lb)   01/26/22 74.3 kg (163 lb 12.8 oz)   10/21/21 71.2 kg (157 lb)     Physical Exam    Throat: Smaller tonsils that close in a bit more when saying 'ah'.  TEST RESULTS:  - Liver enzymes: September 2023, mildly elevated  - Repeat liver enzymes (ASD and ALT): October 2023, normalized  - Blood count: Monocytes 12% (upper end of normal range)       Physical Exam  Vitals reviewed.   Constitutional:       Appearance: Normal appearance. She is well-developed. She is obese.   HENT:      Head: Normocephalic and atraumatic.      Right Ear: Tympanic membrane and external ear normal.      Left Ear: Tympanic membrane and external ear normal.      Nose: Nose normal.      Mouth/Throat:      Mouth: Mucous membranes are moist.      Pharynx: Oropharynx is clear.   Eyes:      Conjunctiva/sclera: Conjunctivae normal.      Pupils: Pupils are equal, round, and reactive to light.   Neck:      Thyroid: No thyromegaly.   Cardiovascular:      Rate and  Rhythm: Normal rate and regular rhythm.      Heart sounds: Normal heart sounds. No murmur heard.     No friction rub. No gallop.   Pulmonary:      Effort: Pulmonary effort is normal. No respiratory distress.      Breath sounds: Normal breath sounds. No wheezing or rales.   Abdominal:      General: Bowel sounds are normal. There is no distension.      Palpations: Abdomen is soft.      Tenderness: There is no abdominal tenderness. There is no rebound.   Musculoskeletal:         General: Normal range of motion.      Cervical back: Normal range of motion and neck supple.   Lymphadenopathy:      Cervical: No cervical adenopathy.   Skin:     General: Skin is warm and dry.      Findings: No rash.   Neurological:      Mental Status: She is alert and oriented to person, place, and time.   Psychiatric:         Attention and Perception: Attention and perception normal.         Mood and Affect: Mood and affect normal.         Speech: Speech normal.         Behavior: Behavior normal.         Thought Content: Thought content normal.         Cognition and Memory: Cognition and memory normal.         Judgment: Judgment normal.         Assessment:     1. Routine general medical examination at a health care facility    2. Migraine with aura and without status migrainosus, not intractable    3. Encounter for screening for cervical cancer    4. Snoring    5. Abnormal weight gain        LABS:   Lab Results   Component Value Date    HGBA1C 5.0 01/26/2022      Lab Results   Component Value Date    CHOL 150 09/12/2023    CHOL 144 01/26/2022     Lab Results   Component Value Date    LDLCALC 84.8 09/12/2023    LDLCALC 67.6 01/26/2022     Lab Results   Component Value Date    WBC 7.15 09/12/2023    HGB 13.1 09/12/2023    HCT 40.5 09/12/2023     09/12/2023    CHOL 150 09/12/2023    TRIG 86 09/12/2023    HDL 48 09/12/2023    ALT 21 10/18/2023    AST 25 10/18/2023     09/12/2023    K 4.5 09/12/2023     09/12/2023    CREATININE  0.8 09/12/2023    BUN 11 09/12/2023    CO2 25 09/12/2023    INR 1.0 10/18/2023    HGBA1C 5.0 01/26/2022       Plan:   Karen was seen today for annual exam.    Diagnoses and all orders for this visit:    Routine general medical examination at a health care facility  -     Hemoglobin A1C; Future  -     CBC Without Differential; Future  -     Comprehensive Metabolic Panel; Future  -     TSH; Future  -     T4, Free; Future  -     Lipid Panel; Future    Migraine with aura and without status migrainosus, not intractable  -     rizatriptan (MAXALT-MLT) 5 MG disintegrating tablet; Take 1 tablet (5 mg total) by mouth as needed for Migraine (max dose 4 in 24 hrs). May repeat in 2 hours if needed    Encounter for screening for cervical cancer  -     Ambulatory referral/consult to Obstetrics / Gynecology; Future    Snoring    Abnormal weight gain  -     TSH; Future  -     T4, Free; Future        Assessment & Plan    IMPRESSION:  - Assessed patient's migraine management, noting significant improvement with magnesium supplementation and lifestyle changes  - Evaluated snoring complaints, considering positional factors and potential need for further intervention if symptoms persist  - Reviewed weight gain concerns in context of increased physical activity and upcoming [employer] race  - Will recheck thyroid function, metabolic panel, and lipid profile given reported weight gain  - Considered mono as potential cause of previously elevated liver enzymes, which subsequently normalized    PLAN SUMMARY:  - Order CBC, CMP, lipid panel, and thyroid function tests  - Refer to Dr. Karen Felipe for pap smear  - Start Maxalt (generic) for abortive migraine therapy  - Continue magnesium supplements for migraine prevention  - Implement hydration strategy and use smaller plates for portion control  - Try Breathe Right strips and existing TMJ mouth guard for snoring  - Use positional changes to reduce snoring (tennis ball technique, elevate bed head, body  pillow)  - Contact office if snoring persists; potential referral to ENT specialist  - Stretch before and after upcoming race    PAP SMEAR:  - Noted that the patient is due for a pap smear, with the last one performed in .  - Referred the patient to Dr. Karen Felipe for a pap smear.    SNORING:  - Evaluated the patient's throat, noting smaller tonsils that close in more when saying 'ah' and a normal uvula.  - Explained potential causes of snoring, including tongue position, jaw relaxation during sleep, nasal congestion, positional factors, and weight.  - Provided information on sleep apnea and its potential health impacts.  - Explained the purpose and process of a home sleep study.  - Recommend various treatments includin. Positional changes: Use a tennis ball attached to pajamas to discourage back sleeping, consider elevating the head of the bed, use a body pillow to maintain side sleeping position. 2. Use of Breathe Right strips. 3. Trying the patient's existing TMJ mouth guard to potentially alleviate snoring.  - Instructed the patient to contact the office if snoring persists despite positional changes and use of mouthpiece.  - Offered to refer the patient to an ENT specialist if current suggestions don't alleviate the snoring.  - Discussed the relationship between weight and snoring.    WEIGHT MANAGEMENT:  - Ordered CBC, CMP, lipid panel, and thyroid function tests to evaluate the patient's weight gain.  - Provided dietary advice including drinking water before meals, using smaller plates, and balancing protein, fiber, and carbohydrates.  - Acknowledged the patient's increased activity, including running and weight training at the gym.  - Discussed the benefits of weight training for metabolism.  - Advised stretching before and after the race.  - Patient to implement hydration strategy before meals to aid in portion control.  - Patient to use smaller plates at home to manage portion  sizes.    MIGRAINE:  - Started Maxalt (g  eneric) for abortive migraine therapy, to be filled and kept on hand for potential future use.  - Continued magnesium supplements for migraine prevention.         US Abdomen Limited  Narrative: EXAMINATION:  US ABDOMEN LIMITED    CLINICAL HISTORY:  Abnormal levels of other serum enzymes    TECHNIQUE:  Limited ultrasound of the right upper quadrant of the abdomen (including pancreas, liver, gallbladder, common bile duct, and spleen) was performed.    COMPARISON:  None    FINDINGS:  Liver: Normal in size measuring 12.6 cm. The liver demonstrates homogenous echotexture. no focal hepatic lesions are seen.    Biliary system: The gallbladder demonstrates no evidence of calculi. No gallbladder wall thickening.  No sonographic Shook sign. No pericholecystic fluid. The common duct is not dilated, measuring 2.1 mm. No intrahepatic ductal dilatation.    Pancreas: The visualized portions of pancreas appear normal    Spleen: The spleen measures 9.2 x 2.9 cm.    Miscellaneous: No ascites.  Impression: 1.  No significant abnormality identified.    Electronically signed by: Faizan Bowden DO  Date:    10/18/2023  Time:    11:19    The ASCVD Risk score (Shan DK, et al., 2019) failed to calculate for the following reasons:    The 2019 ASCVD risk score is only valid for ages 40 to 79    Follow-up: Follow up in about 1 year (around 1/14/2026) for physical with Dr HENDRICKSON.    I spent a total of     31  minutes face to face and non-face to face on the date of this visit.This includes time preparing to see the patient (eg, review of tests, notes), obtaining and/or reviewing additional history from an independent historian and/or outside medical records, documenting clinical information in the electronic health record, independently interpreting results and/or communicating results to the patient/family/caregiver, or care coordinator.  Visit today included increased complexity associated with the  care of the episodic problem addressed and managing the longitudinal care of the patient due to the serious and/or complex managed problem(s).    This note was generated with the assistance of ambient listening technology. Verbal consent was obtained by the patient and accompanying visitor(s) for the recording of patient appointment to facilitate this note. I attest to having reviewed and edited the generated note for accuracy, though some syntax or spelling errors may persist. Please contact the author of this note for any clarification.       There are no Patient Instructions on file for this visit.